# Patient Record
Sex: FEMALE | Race: WHITE | NOT HISPANIC OR LATINO | Employment: FULL TIME | URBAN - METROPOLITAN AREA
[De-identification: names, ages, dates, MRNs, and addresses within clinical notes are randomized per-mention and may not be internally consistent; named-entity substitution may affect disease eponyms.]

---

## 2017-03-30 RX ORDER — LISINOPRIL AND HYDROCHLOROTHIAZIDE 12.5; 1 MG/1; MG/1
TABLET ORAL
Qty: 30 TABLET | Refills: 5 | Status: SHIPPED | OUTPATIENT
Start: 2017-03-30 | End: 2017-10-01 | Stop reason: SDUPTHER

## 2017-06-04 RX ORDER — MESALAMINE 1000 MG/1
SUPPOSITORY RECTAL
Qty: 30 SUPPOSITORY | Refills: 12 | Status: SHIPPED | OUTPATIENT
Start: 2017-06-04 | End: 2018-07-05 | Stop reason: SDUPTHER

## 2017-06-16 ENCOUNTER — TELEPHONE (OUTPATIENT)
Dept: GASTROENTEROLOGY | Facility: CLINIC | Age: 62
End: 2017-06-16

## 2017-06-26 ENCOUNTER — TELEPHONE (OUTPATIENT)
Dept: GASTROENTEROLOGY | Facility: CLINIC | Age: 62
End: 2017-06-26

## 2017-06-26 NOTE — TELEPHONE ENCOUNTER
Returned patient's call. Went over prep information with patient. Ok to drink clear liquid with blue coloring). She also wants to know if its ok to have Colonoscopy with dx of Ulcerative proctocolitis. Transfer call to [facility].

## 2017-07-03 RX ORDER — MONTELUKAST SODIUM 10 MG/1
TABLET ORAL
Qty: 30 TABLET | Refills: 5 | Status: SHIPPED | OUTPATIENT
Start: 2017-07-03 | End: 2017-12-29 | Stop reason: SDUPTHER

## 2017-07-06 ENCOUNTER — OUTSIDE FACILITY SERVICE (OUTPATIENT)
Dept: GASTROENTEROLOGY | Facility: CLINIC | Age: 62
End: 2017-07-06

## 2017-07-06 PROCEDURE — G0121 COLON CA SCRN NOT HI RSK IND: HCPCS | Performed by: INTERNAL MEDICINE

## 2017-07-06 PROCEDURE — G0500 MOD SEDAT ENDO SERVICE >5YRS: HCPCS | Performed by: INTERNAL MEDICINE

## 2017-10-02 RX ORDER — LISINOPRIL AND HYDROCHLOROTHIAZIDE 12.5; 1 MG/1; MG/1
TABLET ORAL
Qty: 30 TABLET | Refills: 5 | Status: SHIPPED | OUTPATIENT
Start: 2017-10-02 | End: 2018-04-02 | Stop reason: SDUPTHER

## 2017-10-06 NOTE — TELEPHONE ENCOUNTER
10/10/2017      Rosie Gifford  [address]  [facility] [address]      Dear Rosie      This letter is to confirm your procedure with Dr Jordan on 11/27/17, Your arrival time is 10:30 a.m.. Please review the enclosed prep instructions for your procedure.    If you have any questions regarding these instructions or need to reschedule please call the office at  [phone].       Sincerely,      Jennifer/Constance      Your procedure will be done at the hospital below.     [x]  [facility], [address], [facility]  [address]       4th Floor GI Lab. Take elevator left of [employer] (south elevator).       Turn right off of elevator on 4th floor. Door in front of you        GI Lab.                 Went over bowel prep instructions with patient. Patient voiced understanding.

## 2017-11-15 ENCOUNTER — OFFICE VISIT (OUTPATIENT)
Dept: FAMILY MEDICINE CLINIC | Facility: CLINIC | Age: 62
End: 2017-11-15

## 2017-11-15 VITALS
HEART RATE: 90 BPM | DIASTOLIC BLOOD PRESSURE: 84 MMHG | WEIGHT: 230 LBS | TEMPERATURE: 97 F | HEIGHT: 69 IN | BODY MASS INDEX: 34.07 KG/M2 | OXYGEN SATURATION: 98 % | SYSTOLIC BLOOD PRESSURE: 124 MMHG

## 2017-11-15 DIAGNOSIS — J98.01 BRONCHOSPASM: ICD-10-CM

## 2017-11-15 DIAGNOSIS — R05.9 COUGH: ICD-10-CM

## 2017-11-15 DIAGNOSIS — J40 LTB (LARYNGOTRACHEOBRONCHITIS): Primary | ICD-10-CM

## 2017-11-15 PROCEDURE — 96372 THER/PROPH/DIAG INJ SC/IM: CPT | Performed by: FAMILY MEDICINE

## 2017-11-15 PROCEDURE — 99214 OFFICE O/P EST MOD 30 MIN: CPT | Performed by: FAMILY MEDICINE

## 2017-11-15 RX ORDER — METHYLPREDNISOLONE 4 MG/1
TABLET ORAL
Qty: 21 TABLET | Refills: 0 | Status: SHIPPED | OUTPATIENT
Start: 2017-11-15 | End: 2018-08-24

## 2017-11-15 RX ORDER — GUAIFENESIN AND CODEINE PHOSPHATE 100; 10 MG/5ML; MG/5ML
10 SOLUTION ORAL 4 TIMES DAILY PRN
Qty: 236 ML | Refills: 0 | Status: SHIPPED | OUTPATIENT
Start: 2017-11-15 | End: 2018-08-24

## 2017-11-15 RX ORDER — DOXYCYCLINE 100 MG/1
100 TABLET ORAL 2 TIMES DAILY
Qty: 20 TABLET | Refills: 0 | Status: SHIPPED | OUTPATIENT
Start: 2017-11-15 | End: 2017-11-25

## 2017-11-15 RX ORDER — VIT C/B6/B5/MAGNESIUM/HERB 173 50-5-6-5MG
500 CAPSULE ORAL DAILY
COMMUNITY
End: 2018-08-24

## 2017-11-15 RX ORDER — ALBUTEROL SULFATE 90 UG/1
2 AEROSOL, METERED RESPIRATORY (INHALATION) EVERY 4 HOURS PRN
Qty: 18 G | Refills: 1 | Status: SHIPPED | OUTPATIENT
Start: 2017-11-15 | End: 2019-08-28

## 2017-11-15 RX ORDER — FLUTICASONE PROPIONATE 50 MCG
2 SPRAY, SUSPENSION (ML) NASAL DAILY
COMMUNITY

## 2017-11-15 RX ORDER — METHYLPREDNISOLONE ACETATE 40 MG/ML
120 INJECTION, SUSPENSION INTRA-ARTICULAR; INTRALESIONAL; INTRAMUSCULAR; SOFT TISSUE ONCE
Status: COMPLETED | OUTPATIENT
Start: 2017-11-15 | End: 2017-11-15

## 2017-11-15 RX ORDER — PYRIDOXINE HCL (VITAMIN B6) 100 MG
500 TABLET ORAL DAILY
COMMUNITY
End: 2018-08-24

## 2017-11-15 RX ADMIN — METHYLPREDNISOLONE ACETATE 120 MG: 40 INJECTION, SUSPENSION INTRA-ARTICULAR; INTRALESIONAL; INTRAMUSCULAR; SOFT TISSUE at 11:16

## 2017-11-15 NOTE — PROGRESS NOTES
Subjective   Sylvia Childress is a 62 y.o. female.     Cough   This is a new problem. The current episode started in the past 7 days. The problem has been waxing and waning. The problem occurs every few hours. The cough is productive of sputum. Associated symptoms include ear congestion, headaches, nasal congestion, postnasal drip, rhinorrhea, a sore throat, shortness of breath and wheezing. Pertinent negatives include no chest pain, chills, ear pain, eye redness, fever, heartburn, hemoptysis, myalgias, rash, sweats or weight loss. Associated symptoms comments: Cough worsened with activity. Nothing aggravates the symptoms. Risk factors: nonsmoker, no 2nd hand smoke exposure. She has tried prescription cough suppressant for the symptoms. The treatment provided no relief. Her past medical history is significant for bronchitis and pneumonia. RADz     The following portions of the patient's history were reviewed and updated as appropriate: allergies, current medications, past family history, past medical history, past social history, past surgical history and problem list.    Review of Systems   Constitutional: Positive for fatigue. Negative for chills, fever and weight loss.   HENT: Positive for congestion, postnasal drip, rhinorrhea, sinus pressure and sore throat. Negative for ear pain, mouth sores, nosebleeds and trouble swallowing.    Eyes: Negative for pain, discharge and redness.   Respiratory: Positive for shortness of breath and wheezing. Negative for hemoptysis.    Cardiovascular: Negative for chest pain and palpitations.   Gastrointestinal: Negative for diarrhea, heartburn, nausea and vomiting.   Genitourinary: Negative for dysuria and hematuria.   Musculoskeletal: Negative for myalgias.   Skin: Negative for rash.   Neurological: Positive for weakness and headaches.   Hematological: Negative for adenopathy.   Psychiatric/Behavioral: Positive for sleep disturbance (due to cough).       Objective    Vitals:     11/15/17 1052   BP: 124/84   Pulse: 90   Temp: 97 °F (36.1 °C)   SpO2: 98%     Body mass index is 34.46 kg/(m^2).  Last 2 weights    11/15/17  1052   Weight: 230 lb (104 kg)       Physical Exam   Constitutional: She is oriented to person, place, and time. She appears well-developed and well-nourished. She is cooperative. She appears ill. No distress.   HENT:   Head: Normocephalic and atraumatic.   Right Ear: Tympanic membrane, external ear and ear canal normal.   Left Ear: Tympanic membrane, external ear and ear canal normal.   Nose: Mucosal edema and rhinorrhea (thick, mucoid) present.   Mouth/Throat: Mucous membranes are not dry. No oral lesions. Posterior oropharyngeal erythema present. No oropharyngeal exudate.   Eyes: Conjunctivae and lids are normal.   Neck: Neck supple.   Cardiovascular: Normal rate, regular rhythm, normal heart sounds and intact distal pulses.    Pulmonary/Chest: Effort normal. No respiratory distress (deep breathing triggers cough). She has decreased breath sounds. She has no wheezes. She has no rhonchi. She has no rales.   Lymphadenopathy:     She has no cervical adenopathy.        Right: No supraclavicular adenopathy present.        Left: No supraclavicular adenopathy present.   Neurological: She is alert and oriented to person, place, and time. Gait normal.   Skin: Skin is warm and dry. No rash noted.   Psychiatric: She has a normal mood and affect. Her behavior is normal.   Nursing note and vitals reviewed.      Assessment/Plan   Sylvia was seen today for cough and uri.    Diagnoses and all orders for this visit:    LTB (laryngotracheobronchitis)    Bronchospasm  -     methylPREDNISolone acetate (DEPO-medrol) injection 120 mg; Inject 3 mL into the shoulder, thigh, or buttocks 1 (One) Time.    Cough  -     methylPREDNISolone acetate (DEPO-medrol) injection 120 mg; Inject 3 mL into the shoulder, thigh, or buttocks 1 (One) Time.    Other orders  -     doxycycline (ADOXA) 100 MG tablet; Take  1 tablet by mouth 2 (Two) Times a Day for 10 days.  -     albuterol (PROVENTIL HFA;VENTOLIN HFA) 108 (90 Base) MCG/ACT inhaler; Inhale 2 puffs Every 4 (Four) Hours As Needed for Wheezing or Shortness of Air.  -     MethylPREDNISolone (MEDROL, JESSICA,) 4 MG tablet; Take as directed on package instructions.  -     guaifenesin-codeine (GUAIFENESIN AC) 100-10 MG/5ML liquid; Take 10 mL by mouth 4 (Four) Times a Day As Needed for Cough.    Tx as above due to hx of RADz, bronchitis resulting in PNE with prolonged recovery time.   Rest, fluids.  Patient was encouraged to keep me informed of any acute changes, lack of improvement, or any new concerning symptoms.  Pt is aware of reasons to seek emergent care.  Patient voiced understanding of all instructions and denied further questions.

## 2017-12-29 RX ORDER — MONTELUKAST SODIUM 10 MG/1
TABLET ORAL
Qty: 30 TABLET | Refills: 5 | Status: SHIPPED | OUTPATIENT
Start: 2017-12-29 | End: 2018-07-02 | Stop reason: SDUPTHER

## 2018-04-02 RX ORDER — LISINOPRIL AND HYDROCHLOROTHIAZIDE 12.5; 1 MG/1; MG/1
TABLET ORAL
Qty: 30 TABLET | Refills: 5 | Status: SHIPPED | OUTPATIENT
Start: 2018-04-02 | End: 2018-09-29 | Stop reason: SDUPTHER

## 2018-04-09 RX ORDER — AZELAIC ACID 0.15 G/G
GEL TOPICAL DAILY
Qty: 50 G | Refills: 5 | Status: SHIPPED | OUTPATIENT
Start: 2018-04-09 | End: 2020-06-08 | Stop reason: SDUPTHER

## 2018-04-09 RX ORDER — DESONIDE 0.5 MG/G
CREAM TOPICAL 2 TIMES DAILY
Qty: 15 G | Refills: 5 | Status: SHIPPED | OUTPATIENT
Start: 2018-04-09 | End: 2019-06-26 | Stop reason: SDUPTHER

## 2018-07-02 RX ORDER — MESALAMINE 1000 MG/1
SUPPOSITORY RECTAL
Qty: 30 SUPPOSITORY | Refills: 12 | OUTPATIENT
Start: 2018-07-02

## 2018-07-02 RX ORDER — MONTELUKAST SODIUM 10 MG/1
TABLET ORAL
Qty: 30 TABLET | Refills: 5 | Status: SHIPPED | OUTPATIENT
Start: 2018-07-02 | End: 2019-05-14

## 2018-07-03 NOTE — TELEPHONE ENCOUNTER
Please contact Sylvia and refill her prescription for 2-3 months.  Then please asked and were Nepali on the end of the day after this period of time.  Dr. Ordoñez

## 2018-07-08 RX ORDER — MESALAMINE 1000 MG/1
SUPPOSITORY RECTAL
Qty: 30 SUPPOSITORY | Refills: 12 | Status: SHIPPED | OUTPATIENT
Start: 2018-07-08 | End: 2019-07-23 | Stop reason: SDUPTHER

## 2018-08-24 ENCOUNTER — OFFICE VISIT (OUTPATIENT)
Dept: FAMILY MEDICINE CLINIC | Facility: CLINIC | Age: 63
End: 2018-08-24

## 2018-08-24 VITALS
SYSTOLIC BLOOD PRESSURE: 108 MMHG | BODY MASS INDEX: 36.88 KG/M2 | HEART RATE: 74 BPM | OXYGEN SATURATION: 97 % | DIASTOLIC BLOOD PRESSURE: 80 MMHG | HEIGHT: 67 IN | WEIGHT: 235 LBS

## 2018-08-24 DIAGNOSIS — Z13.820 ENCOUNTER FOR SCREENING FOR OSTEOPOROSIS: ICD-10-CM

## 2018-08-24 DIAGNOSIS — Z13.220 ENCOUNTER FOR LIPID SCREENING FOR CARDIOVASCULAR DISEASE: ICD-10-CM

## 2018-08-24 DIAGNOSIS — M85.89 OSTEOPENIA OF MULTIPLE SITES: ICD-10-CM

## 2018-08-24 DIAGNOSIS — Z13.6 ENCOUNTER FOR LIPID SCREENING FOR CARDIOVASCULAR DISEASE: ICD-10-CM

## 2018-08-24 DIAGNOSIS — R53.82 CHRONIC FATIGUE: ICD-10-CM

## 2018-08-24 DIAGNOSIS — I10 ESSENTIAL HYPERTENSION: ICD-10-CM

## 2018-08-24 DIAGNOSIS — Z23 NEED FOR DIPHTHERIA-TETANUS-PERTUSSIS (TDAP) VACCINE, ADULT/ADOLESCENT: ICD-10-CM

## 2018-08-24 DIAGNOSIS — Z23 NEED FOR SHINGLES VACCINE: ICD-10-CM

## 2018-08-24 DIAGNOSIS — Z12.31 ENCOUNTER FOR SCREENING MAMMOGRAM FOR BREAST CANCER: ICD-10-CM

## 2018-08-24 DIAGNOSIS — Z00.00 WELL WOMAN EXAM WITHOUT GYNECOLOGICAL EXAM: Primary | ICD-10-CM

## 2018-08-24 DIAGNOSIS — R63.5 ABNORMAL WEIGHT GAIN: ICD-10-CM

## 2018-08-24 DIAGNOSIS — Z13.1 SCREENING FOR DIABETES MELLITUS: ICD-10-CM

## 2018-08-24 LAB
ALBUMIN SERPL-MCNC: 4.7 G/DL (ref 3.5–5)
ALBUMIN/GLOB SERPL: 1.7 G/DL (ref 1–2)
ALP SERPL-CCNC: 89 U/L (ref 38–126)
ALT SERPL-CCNC: 39 U/L (ref 13–69)
AST SERPL-CCNC: 32 U/L (ref 15–46)
BILIRUB SERPL-MCNC: 0.5 MG/DL (ref 0.2–1.3)
BUN SERPL-MCNC: 19 MG/DL (ref 7–20)
BUN/CREAT SERPL: 27.1 (ref 7.1–23.5)
CALCIUM SERPL-MCNC: 10.1 MG/DL (ref 8.4–10.2)
CHLORIDE SERPL-SCNC: 99 MMOL/L (ref 98–107)
CHOLEST SERPL-MCNC: 192 MG/DL (ref 0–199)
CO2 SERPL-SCNC: 30 MMOL/L (ref 26–30)
CREAT SERPL-MCNC: 0.7 MG/DL (ref 0.6–1.3)
ERYTHROCYTE [DISTWIDTH] IN BLOOD BY AUTOMATED COUNT: 13.8 % (ref 11.5–14.5)
GLOBULIN SER CALC-MCNC: 2.8 GM/DL
GLUCOSE SERPL-MCNC: 91 MG/DL (ref 74–98)
HBA1C MFR BLD: 6.1 %
HCT VFR BLD AUTO: 41.3 % (ref 37–47)
HDLC SERPL-MCNC: 48 MG/DL (ref 40–60)
HGB BLD-MCNC: 13.5 G/DL (ref 12–16)
IRON SERPL-MCNC: 154 MCG/DL (ref 37–181)
LDLC SERPL CALC-MCNC: 109 MG/DL (ref 0–99)
MCH RBC QN AUTO: 31.7 PG (ref 27–31)
MCHC RBC AUTO-ENTMCNC: 32.7 G/DL (ref 30–37)
MCV RBC AUTO: 96.9 FL (ref 81–99)
PLATELET # BLD AUTO: 223 10*3/MM3 (ref 130–400)
POTASSIUM SERPL-SCNC: 4.1 MMOL/L (ref 3.5–5.1)
PROT SERPL-MCNC: 7.5 G/DL (ref 6.3–8.2)
RBC # BLD AUTO: 4.26 10*6/MM3 (ref 4.2–5.4)
SODIUM SERPL-SCNC: 139 MMOL/L (ref 137–145)
TRIGL SERPL-MCNC: 173 MG/DL
TSH SERPL DL<=0.005 MIU/L-ACNC: 1.09 MIU/ML (ref 0.47–4.68)
VLDLC SERPL CALC-MCNC: 34.6 MG/DL
WBC # BLD AUTO: 5.18 10*3/MM3 (ref 4.8–10.8)

## 2018-08-24 PROCEDURE — 90471 IMMUNIZATION ADMIN: CPT | Performed by: FAMILY MEDICINE

## 2018-08-24 PROCEDURE — 99396 PREV VISIT EST AGE 40-64: CPT | Performed by: FAMILY MEDICINE

## 2018-08-24 PROCEDURE — 90715 TDAP VACCINE 7 YRS/> IM: CPT | Performed by: FAMILY MEDICINE

## 2018-08-24 NOTE — PROGRESS NOTES
Subjective   Sylvia Childress is a 63 y.o. female.     History of Present Illness  Mrs. Childress presents today for routine f/u as wellness exam.     Reproductive History  A0  Pregnancy complications (HTN, DM): n/a  Sexual Activity: yes, monogamous male   Contraception: n/a, postmenopausal  Menses: dc'd  H/O abnormal pap: no  Cervical procedures: no     Social Hx  Household members: 2, pt and spouse  Marital status:   Tobacco use: never  EtOH use: occ, < 1 per day  Illicit drug use: never     Lifestyle  Diet: varied, excess calories  Exercise: intermittent walking  Guns in home: no  Using seatbelt: yes  Sunscreen: yes  Mental Health: stable, no concerns  Feels safe in home: yes     Screenings  Last pap:   Last breast exam: early   Last mammogram: 2015  Colonoscopy:   DEXA: 2015, osteopenia  FLP:   BG/A1c:   Vitamin D:   Last dental check up: < 6 months  Last vision exam: yearly  Immunizations UTD: no             Hypertension: Patient here for follow-up of elevated blood pressure. She is intermittently exercising and is fairly adherent to low salt diet.  Blood pressure is well controlled at home. Cardiac symptoms include fatigue, pedal edema. Patient denies chest pain, claudication, cough, exertional chest pressure/discomfort, irregular heart beat, near-syncope, orthopnea, palpitations, syncope and tachypnea.  Cardiovascular risk factors: hypertension, obesity (BMI >= 30 kg/m2) and sedentary lifestyle. Use of agents associated with hypertension: none. History of target organ damage: none. Taking medication as rx'd.  Denies side effects.      Sleep is improving. Rosacea well controlled on current meds. Seasonal allergies and RAD well controlled.  Not having to use rescue inhaler but rarely.      The following portions of the patient's history were reviewed and updated as appropriate: allergies, current medications, past family history, past medical history, past social history,  past surgical history and problem list.    Review of Systems   Constitutional: Positive for fatigue and unexpected weight change. Negative for activity change, appetite change, diaphoresis and fever.   HENT: Positive for postnasal drip. Negative for congestion, mouth sores, nosebleeds, rhinorrhea, sore throat and trouble swallowing.    Eyes: Negative for pain, redness, itching and visual disturbance.   Respiratory: Positive for cough (mild, dry, intermittent ). Negative for chest tightness, shortness of breath and wheezing.    Cardiovascular: Positive for leg swelling.   Gastrointestinal: Negative for abdominal pain, diarrhea, nausea and vomiting.   Endocrine: Positive for heat intolerance. Negative for polydipsia and polyuria.   Genitourinary: Negative for dyspareunia, dysuria, frequency, genital sores, hematuria, pelvic pain, urgency, vaginal bleeding and vaginal discharge.   Musculoskeletal: Positive for arthralgias and joint swelling. Negative for back pain, gait problem, myalgias, neck pain and neck stiffness.   Skin: Negative for rash and wound.   Neurological: Negative for dizziness, syncope, weakness, numbness and headaches.   Hematological: Negative for adenopathy. Does not bruise/bleed easily.   Psychiatric/Behavioral: Negative for confusion, dysphoric mood and suicidal ideas. The patient is not nervous/anxious.        Objective    Vitals:    08/24/18 1110   BP: 108/80   Pulse: 74   SpO2: 97%     Body mass index is 37.08 kg/m².  1    08/24/18  1110   Weight: 107 kg (235 lb)       Physical Exam   Constitutional: She is oriented to person, place, and time. She appears well-developed and well-nourished. She is cooperative. She does not appear ill. No distress.   obese   HENT:   Head: Normocephalic and atraumatic.   Right Ear: Tympanic membrane, external ear and ear canal normal.   Left Ear: Tympanic membrane, external ear and ear canal normal.   Nose: Nose normal. No mucosal edema or rhinorrhea.    Mouth/Throat: Oropharynx is clear and moist and mucous membranes are normal. No oral lesions. No posterior oropharyngeal erythema.   Eyes: Conjunctivae, EOM and lids are normal.   Neck: Phonation normal. Neck supple. Normal carotid pulses present. No thyroid mass and no thyromegaly present.   Cardiovascular: Normal rate, regular rhythm, S1 normal, S2 normal and intact distal pulses.    Pulmonary/Chest: Effort normal and breath sounds normal.   Abdominal: Soft. Bowel sounds are normal. She exhibits no distension and no mass (Exam limited by body habitus). There is no hepatosplenomegaly (exam limited by body habitus). There is no tenderness.   Genitourinary: Rectum normal, vagina normal and uterus normal. Rectal exam shows no mass, anal tone normal and guaiac negative stool. Pelvic exam was performed with patient supine. There is no rash or lesion on the right labia. There is no rash or lesion on the left labia. Uterus is not enlarged and not tender. Cervix exhibits no motion tenderness, no discharge and no friability. Right adnexum displays no mass, no tenderness and no fullness. Left adnexum displays no mass, no tenderness and no fullness. No erythema or bleeding in the vagina. No vaginal discharge found.   Musculoskeletal: She exhibits no edema, tenderness or deformity.     Vascular Status -  Her right foot exhibits abnormal foot edema (mild). Her left foot exhibits abnormal foot edema (mild).  Lymphadenopathy:     She has no cervical adenopathy.   Neurological: She is alert and oriented to person, place, and time. She has normal strength and normal reflexes. She displays no tremor. Gait normal.   Skin: Skin is warm and dry. No ecchymosis and no rash noted. There is pallor.   Psychiatric: She has a normal mood and affect. Her behavior is normal. Cognition and memory are normal.   Good eye contact. Answers questions appropriately. Good personal hygiene and grooming.   Nursing note and vitals  reviewed.      Assessment/Plan   Sylvia was seen today for annual exam.    Diagnoses and all orders for this visit:    Well woman exam without gynecological exam  -     CBC (No Diff)  -     Comprehensive Metabolic Panel  -     Lipid Panel  -     Iron  -     Hemoglobin A1c  -     TSH Rfx On Abnormal To Free T4  -     Tdap Vaccine Greater Than or Equal To 8yo IM  -     Mammo Screening Digital Tomosynthesis Bilateral With CAD; Future  -     DEXA Bone Density Axial; Future    Screening for diabetes mellitus  -     Hemoglobin A1c    Encounter for lipid screening for cardiovascular disease  -     Lipid Panel    Need for diphtheria-tetanus-pertussis (Tdap) vaccine, adult/adolescent  -     Tdap Vaccine Greater Than or Equal To 8yo IM    Need for shingles vaccine  -     Zoster Vac Recomb Adjuvanted 50 MCG reconstituted suspension; Inject 50 mcg into the appropriate muscle as directed by prescriber Take As Directed. Repeat 6 months.    Abnormal weight gain  -     Comprehensive Metabolic Panel  -     TSH Rfx On Abnormal To Free T4    Essential hypertension  -     CBC (No Diff)  -     Comprehensive Metabolic Panel  -     TSH Rfx On Abnormal To Free T4    Osteopenia of multiple sites  -     DEXA Bone Density Axial; Future    Chronic fatigue  -     CBC (No Diff)  -     Comprehensive Metabolic Panel  -     Iron  -     TSH Rfx On Abnormal To Free T4    Encounter for screening mammogram for breast cancer  -     Mammo Screening Digital Tomosynthesis Bilateral With CAD; Future    Encounter for screening for osteoporosis  -     DEXA Bone Density Axial; Future    Age appropriate preventive care reviewed including cancer screenings, safety measures, mental health concerns, supplements, prevention of CV disease and DM, etc. Handout provided.    I will contact patient regarding test results and provide instructions regarding any necessary changes in plan of care.    HTN controlled.      Obesity with excess caloric intake and generally  sedentary lifestyle. Nutrition and activity goals reviewed including: mainly water to drink, limit white flour/processed sugar, high protein, high fiber carbs, good breakfast, working toward 150 mins cardio per week, resistance training 2x/week. Pt advised to eat a heart healthy diet.    Routine yearly follow-up, sooner as needed/instructed.  Patient was encouraged to keep me informed of any acute changes or any new concerning symptoms.  Pt is aware of reasons to seek emergent care.  Patient voiced understanding of all instructions and denied further questions.        Please note that portions of this note may have been completed with a voice recognition program. Efforts were made to edit the dictations, but occasionally words are mistranscribed.

## 2018-08-27 DIAGNOSIS — R73.03 PREDIABETES: Primary | ICD-10-CM

## 2018-10-01 RX ORDER — LISINOPRIL AND HYDROCHLOROTHIAZIDE 12.5; 1 MG/1; MG/1
TABLET ORAL
Qty: 30 TABLET | Refills: 0 | Status: SHIPPED | OUTPATIENT
Start: 2018-10-01 | End: 2018-10-28 | Stop reason: SDUPTHER

## 2018-10-29 RX ORDER — LISINOPRIL AND HYDROCHLOROTHIAZIDE 12.5; 1 MG/1; MG/1
TABLET ORAL
Qty: 30 TABLET | Refills: 5 | Status: SHIPPED | OUTPATIENT
Start: 2018-10-29 | End: 2019-05-01 | Stop reason: SDUPTHER

## 2018-11-23 ENCOUNTER — RESULTS ENCOUNTER (OUTPATIENT)
Dept: FAMILY MEDICINE CLINIC | Facility: CLINIC | Age: 63
End: 2018-11-23

## 2018-11-23 DIAGNOSIS — R73.03 PREDIABETES: ICD-10-CM

## 2019-05-02 RX ORDER — LISINOPRIL AND HYDROCHLOROTHIAZIDE 12.5; 1 MG/1; MG/1
TABLET ORAL
Qty: 30 TABLET | Refills: 2 | Status: SHIPPED | OUTPATIENT
Start: 2019-05-02 | End: 2019-07-23 | Stop reason: SDUPTHER

## 2019-05-14 ENCOUNTER — OFFICE VISIT (OUTPATIENT)
Dept: FAMILY MEDICINE CLINIC | Facility: CLINIC | Age: 64
End: 2019-05-14

## 2019-05-14 VITALS
HEART RATE: 97 BPM | BODY MASS INDEX: 35.81 KG/M2 | WEIGHT: 228.13 LBS | TEMPERATURE: 97.6 F | HEIGHT: 67 IN | SYSTOLIC BLOOD PRESSURE: 115 MMHG | OXYGEN SATURATION: 94 % | DIASTOLIC BLOOD PRESSURE: 85 MMHG

## 2019-05-14 DIAGNOSIS — R10.30 LOWER ABDOMINAL PAIN: Primary | ICD-10-CM

## 2019-05-14 DIAGNOSIS — R31.29 MICROSCOPIC HEMATURIA: ICD-10-CM

## 2019-05-14 DIAGNOSIS — R53.81 MALAISE: ICD-10-CM

## 2019-05-14 PROBLEM — J30.9 ALLERGIC RHINITIS: Status: ACTIVE | Noted: 2017-03-02

## 2019-05-14 LAB
ALBUMIN SERPL-MCNC: 4.1 G/DL (ref 3.5–5)
ALBUMIN/GLOB SERPL: 1.4 G/DL (ref 1–2)
ALP SERPL-CCNC: 90 U/L (ref 38–126)
ALT SERPL-CCNC: 40 U/L (ref 13–69)
AMYLASE SERPL-CCNC: 48 U/L (ref 30–110)
AST SERPL-CCNC: 25 U/L (ref 15–46)
BACTERIA #/AREA URNS HPF: ABNORMAL /HPF
BASOPHILS # BLD AUTO: 0.03 10*3/MM3 (ref 0–0.2)
BASOPHILS NFR BLD AUTO: 0.3 % (ref 0–1.5)
BILIRUB BLD-MCNC: ABNORMAL MG/DL
BILIRUB SERPL-MCNC: 0.5 MG/DL (ref 0.2–1.3)
BUN SERPL-MCNC: 12 MG/DL (ref 7–20)
BUN/CREAT SERPL: 17.1 (ref 7.1–23.5)
CALCIUM SERPL-MCNC: 9.6 MG/DL (ref 8.4–10.2)
CASTS URNS MICRO: ABNORMAL
CHLORIDE SERPL-SCNC: 97 MMOL/L (ref 98–107)
CLARITY, POC: CLEAR
CO2 SERPL-SCNC: 32 MMOL/L (ref 26–30)
COLOR UR: YELLOW
CREAT SERPL-MCNC: 0.7 MG/DL (ref 0.6–1.3)
CRP SERPL-MCNC: 13.9 MG/DL (ref 0–1)
CRYSTALS URNS MICRO: ABNORMAL
EOSINOPHIL # BLD AUTO: 0.05 10*3/MM3 (ref 0–0.4)
EOSINOPHIL NFR BLD AUTO: 0.4 % (ref 0.3–6.2)
EPI CELLS #/AREA URNS HPF: ABNORMAL /HPF
ERYTHROCYTE [DISTWIDTH] IN BLOOD BY AUTOMATED COUNT: 14.2 % (ref 12.3–15.4)
GLOBULIN SER CALC-MCNC: 2.9 GM/DL
GLUCOSE SERPL-MCNC: 103 MG/DL (ref 74–98)
GLUCOSE UR STRIP-MCNC: NEGATIVE MG/DL
HCT VFR BLD AUTO: 40.7 % (ref 34–46.6)
HGB BLD-MCNC: 13.4 G/DL (ref 12–15.9)
IMM GRANULOCYTES # BLD AUTO: 0.04 10*3/MM3 (ref 0–0.05)
IMM GRANULOCYTES NFR BLD AUTO: 0.4 % (ref 0–0.5)
KETONES UR QL: NEGATIVE
LEUKOCYTE EST, POC: NEGATIVE
LIPASE SERPL-CCNC: 68 U/L (ref 23–300)
LYMPHOCYTES # BLD AUTO: 1.27 10*3/MM3 (ref 0.7–3.1)
LYMPHOCYTES NFR BLD AUTO: 11.2 % (ref 19.6–45.3)
MCH RBC QN AUTO: 32.3 PG (ref 26.6–33)
MCHC RBC AUTO-ENTMCNC: 32.9 G/DL (ref 31.5–35.7)
MCV RBC AUTO: 98.1 FL (ref 79–97)
MONOCYTES # BLD AUTO: 0.94 10*3/MM3 (ref 0.1–0.9)
MONOCYTES NFR BLD AUTO: 8.3 % (ref 5–12)
MUCOUS THREADS URNS QL MICRO: ABNORMAL /HPF
NEUTROPHILS # BLD AUTO: 9.05 10*3/MM3 (ref 1.7–7)
NEUTROPHILS NFR BLD AUTO: 79.4 % (ref 42.7–76)
NITRITE UR-MCNC: NEGATIVE MG/ML
NRBC BLD AUTO-RTO: 0 /100 WBC (ref 0–0.2)
PH UR: 7 [PH] (ref 5–8)
PLATELET # BLD AUTO: 204 10*3/MM3 (ref 140–450)
POTASSIUM SERPL-SCNC: 3.9 MMOL/L (ref 3.5–5.1)
PROT SERPL-MCNC: 7 G/DL (ref 6.3–8.2)
PROT UR STRIP-MCNC: NEGATIVE MG/DL
RBC # BLD AUTO: 4.15 10*6/MM3 (ref 3.77–5.28)
RBC # UR STRIP: ABNORMAL /UL
RBC #/AREA URNS HPF: ABNORMAL /HPF
SODIUM SERPL-SCNC: 139 MMOL/L (ref 137–145)
SP GR UR: 1.01 (ref 1–1.03)
UROBILINOGEN UR QL: NORMAL
WBC # BLD AUTO: 11.38 10*3/MM3 (ref 3.4–10.8)
WBC #/AREA URNS HPF: ABNORMAL /HPF

## 2019-05-14 PROCEDURE — 99214 OFFICE O/P EST MOD 30 MIN: CPT | Performed by: FAMILY MEDICINE

## 2019-05-14 PROCEDURE — 81003 URINALYSIS AUTO W/O SCOPE: CPT | Performed by: FAMILY MEDICINE

## 2019-05-14 RX ORDER — METRONIDAZOLE 500 MG/1
500 TABLET ORAL 2 TIMES DAILY
Qty: 14 TABLET | Refills: 0 | Status: SHIPPED | OUTPATIENT
Start: 2019-05-14 | End: 2019-07-18 | Stop reason: SDUPTHER

## 2019-05-14 RX ORDER — CIPROFLOXACIN 500 MG/1
500 TABLET, FILM COATED ORAL 2 TIMES DAILY
Qty: 14 TABLET | Refills: 0 | Status: SHIPPED | OUTPATIENT
Start: 2019-05-14 | End: 2019-07-18 | Stop reason: SDUPTHER

## 2019-05-14 NOTE — PROGRESS NOTES
Subjective   Sylvia Childress is a 63 y.o. female.     She c/o abd pain      Abdominal Pain   This is a new problem. The current episode started in the past 7 days (began with mild symptoms 3 nights ago). The onset quality is gradual. The problem occurs constantly. The problem has been waxing and waning. The pain is located in the suprapubic region and periumbilical region. The pain is moderate. The quality of the pain is aching and cramping. The abdominal pain does not radiate. Associated symptoms include anorexia and a fever (possible subjective). Pertinent negatives include no constipation, diarrhea, dysuria, flatus, frequency, headaches, hematochezia, hematuria, melena, nausea, vomiting or weight loss. Associated symptoms comments: Bloating, malaise. The pain is aggravated by movement. Relieved by: analgesic. She has tried acetaminophen (Excedrin) for the symptoms. The treatment provided mild relief. Her past medical history is significant for ulcerative colitis.     Has upcoming apt at [facility] for annual TVUS (part of research program).    The following portions of the patient's history were reviewed and updated as appropriate: allergies, current medications, past family history, past medical history, past social history, past surgical history and problem list.    Review of Systems   Constitutional: Positive for appetite change, chills, fatigue and fever (possible subjective). Negative for weight loss.   HENT: Negative for congestion, sore throat and trouble swallowing.    Eyes: Negative for visual disturbance.   Respiratory: Negative for cough and shortness of breath.    Cardiovascular: Positive for leg swelling (mild, stable). Negative for chest pain and palpitations.   Gastrointestinal: Positive for abdominal distention, abdominal pain and anorexia. Negative for blood in stool, constipation, diarrhea, flatus, hematochezia, melena, nausea and vomiting.   Genitourinary: Negative for dysuria,  frequency, hematuria, vaginal bleeding and vaginal discharge.   Skin: Negative for rash.   Neurological: Positive for weakness. Negative for headaches.   Hematological: Negative for adenopathy.   Psychiatric/Behavioral: Positive for sleep disturbance. Negative for confusion. The patient is nervous/anxious.      Objective    Vitals:    05/14/19 1019   BP: 115/85   Pulse: 97   Temp: 97.6 °F (36.4 °C)   SpO2: 94%     Body mass index is 36.02 kg/m².      05/14/19  1019   Weight: 103 kg (228 lb 2 oz)         Physical Exam   Constitutional: She is oriented to person, place, and time. She appears well-developed and well-nourished. She is cooperative. She appears ill. No distress.   obese   HENT:   Head: Normocephalic and atraumatic.   Right Ear: Tympanic membrane, external ear and ear canal normal.   Left Ear: Tympanic membrane, external ear and ear canal normal.   Nose: Nose normal.   Mouth/Throat: Mucous membranes are normal. Mucous membranes are not dry. No oral lesions. Posterior oropharyngeal erythema present. No oropharyngeal exudate.   Eyes: Conjunctivae and lids are normal. No scleral icterus. Right eye exhibits no nystagmus. Left eye exhibits no nystagmus.   Neck: Neck supple.   Cardiovascular: Normal rate, regular rhythm, normal heart sounds and intact distal pulses.   Pulmonary/Chest: Effort normal and breath sounds normal.   Abdominal: Soft. She exhibits no distension and no mass (exam limited by body habitus). Bowel sounds are decreased. There is no hepatosplenomegaly (exam limited by body habitus). There is tenderness (mild) in the suprapubic area and left lower quadrant. There is no rigidity, no rebound, no guarding and no CVA tenderness.     Vascular Status -  Her right foot exhibits abnormal foot edema (mild). Her left foot exhibits abnormal foot edema (mild).  Lymphadenopathy:     She has no cervical adenopathy.   Neurological: She is alert and oriented to person, place, and time.   Skin: Skin is warm and  dry. No rash noted.   Nursing note and vitals reviewed.    Recent Results (from the past 24 hour(s))   POC Urinalysis Dipstick, Automated    Collection Time: 05/14/19  1:07 PM   Result Value Ref Range    Color Yellow Yellow, Straw, Dark Yellow, Betsy    Clarity, UA Clear Clear    Specific Gravity  1.015 1.005 - 1.030    pH, Urine 7.0 5.0 - 8.0    Leukocytes Negative Negative    Nitrite, UA Negative Negative    Protein, POC Negative Negative mg/dL    Glucose, UA Negative Negative, 1000 mg/dL (3+) mg/dL    Ketones, UA Negative Negative    Urobilinogen, UA Normal Normal    Bilirubin 1 mg/dL (A) Negative    Blood, UA 50 Sang/ul (A) Negative     Assessment/Plan   Sylvia was seen today for abdominal pain and diverticulitis.    Diagnoses and all orders for this visit:    Lower abdominal pain  -     CBC & Differential  -     Comprehensive Metabolic Panel  -     Amylase  -     Lipase  -     C-reactive Protein  -     Urinalysis, Microscopic Only - Urine, Clean Catch  -     POC Urinalysis Dipstick, Automated    Microscopic hematuria  -     CBC & Differential  -     Comprehensive Metabolic Panel  -     C-reactive Protein  -     Urinalysis, Microscopic Only - Urine, Clean Catch    Malaise  -     CBC & Differential  -     Comprehensive Metabolic Panel  -     C-reactive Protein  -     POC Urinalysis Dipstick, Automated    Other orders  -     ciprofloxacin (CIPRO) 500 MG tablet; Take 1 tablet by mouth 2 (Two) Times a Day for 7 days.  -     metroNIDAZOLE (FLAGYL) 500 MG tablet; Take 1 tablet by mouth 2 (Two) Times a Day for 7 days.       Suprapubic and LLQ pain of multiple possible etiologies. She has non-acute abd exam today. Clinically stable. I have reviewed risks/benefits and potential side effects of various diagnostic/treatment options. Pt and  voice understanding and wish to proceed with empiric coverage for UTI, diverticulitis as well as lab eval as above. Pending response to tx, lab results and clinical course,  consider CT abd/pelvis. She will keep apt as scheduled for TVUS at  Monday next week. Encouraged adequate fluid, bland diet.    I will contact patient regarding test results and provide instructions regarding any necessary changes in plan of care.  Patient was encouraged to keep me informed of any acute changes, lack of improvement, or any new concerning symptoms.  Pt is aware of reasons to seek emergent care.  Patient voiced understanding of all instructions and denied further questions.

## 2019-05-15 DIAGNOSIS — R79.82 ELEVATED C-REACTIVE PROTEIN (CRP): ICD-10-CM

## 2019-05-15 DIAGNOSIS — R10.2 SUPRAPUBIC PAIN: ICD-10-CM

## 2019-05-15 DIAGNOSIS — R10.32 LLQ PAIN: Primary | ICD-10-CM

## 2019-05-15 DIAGNOSIS — D72.9 NEUTROPHILIC LEUKOCYTOSIS: ICD-10-CM

## 2019-05-20 ENCOUNTER — TELEPHONE (OUTPATIENT)
Dept: INTERNAL MEDICINE | Facility: CLINIC | Age: 64
End: 2019-05-20

## 2019-05-20 NOTE — TELEPHONE ENCOUNTER
Patient paged on call provider.  Patient reports she is on antibiotics for diverticulitis.  She was bit by a dog 2 days ago. The dog was vaccinated, but she is noticing some redness around the bite marks.  She was concerned she may need a tetanus vaccine or even a longer course of antibiotics.  She wanted to know if she should go to the ER or the urgent care.  I advised her to go to the .

## 2019-05-29 ENCOUNTER — OFFICE VISIT (OUTPATIENT)
Dept: FAMILY MEDICINE CLINIC | Facility: CLINIC | Age: 64
End: 2019-05-29

## 2019-05-29 VITALS
SYSTOLIC BLOOD PRESSURE: 110 MMHG | HEART RATE: 78 BPM | BODY MASS INDEX: 35.79 KG/M2 | DIASTOLIC BLOOD PRESSURE: 80 MMHG | OXYGEN SATURATION: 98 % | HEIGHT: 67 IN | WEIGHT: 228 LBS
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
## 2019-05-29 DIAGNOSIS — S81.851D DOG BITE OF RIGHT LOWER LEG, SUBSEQUENT ENCOUNTER: Primary | ICD-10-CM

## 2019-05-29 DIAGNOSIS — T14.8XXA ANIMAL BITE: ICD-10-CM

## 2019-05-29 PROCEDURE — 99213 OFFICE O/P EST LOW 20 MIN: CPT | Performed by: FAMILY MEDICINE

## 2019-05-29 RX ORDER — AMOXICILLIN AND CLAVULANATE POTASSIUM 875; 125 MG/1; MG/1
1 TABLET, FILM COATED ORAL EVERY 12 HOURS
Qty: 14 TABLET | Refills: 0 | Status: SHIPPED | OUTPATIENT
Start: 2019-05-29 | End: 2019-06-05

## 2019-05-29 NOTE — PROGRESS NOTES
"Subjective   Sylvia Childress is a 63 y.o. female.     History of Present Illness  Mrs. Childress presents here with her  for recheck of dog bite.  She was seen at urgent care in [place] approximately 2 weeks ago after suffering a dog bite to the right lower extremity at a friend's house.  Dog was a domesticated pet, fully vaccinated.  During urgent care visit she had tetanus updated, was placed on a short course of Augmentin (had already been treated with Flagyl and Cipro for diverticulitis in parentheses.  She has had improvement in pain, swelling but is concerned about persistent bruising, moderate aching/burning tenderness at the site. Bruising has \"spread down leg\". Denies weakness or numbness in lower extremity.  No fevers or night sweats.  No malaise.    The following portions of the patient's history were reviewed and updated as appropriate: allergies, current medications, past family history, past medical history, past social history, past surgical history and problem list.    Review of Systems   Constitutional: Negative for chills, fever and unexpected weight change.   HENT: Negative for sore throat and trouble swallowing.    Respiratory: Negative for cough and shortness of breath.    Cardiovascular: Positive for leg swelling. Negative for chest pain.   Gastrointestinal: Negative for diarrhea, nausea and vomiting.   Musculoskeletal: Positive for arthralgias and gait problem (mild).   Skin: Positive for wound.   Neurological: Negative for dizziness, weakness, numbness and headaches.   Hematological: Negative for adenopathy.   Psychiatric/Behavioral: Negative for confusion.       Objective    Vitals:    05/29/19 1006   BP: 110/80   Pulse: 78   SpO2: 98%     Body mass index is 35.71 kg/m².      05/29/19  1006   Weight: 103 kg (228 lb)       Physical Exam   Constitutional: She is oriented to person, place, and time. She appears well-developed and well-nourished. She is cooperative. She does not appear " ill. No distress.   obese   HENT:   Head: Normocephalic and atraumatic.   Mouth/Throat: Mucous membranes are normal. Mucous membranes are not dry.   Eyes: Conjunctivae and lids are normal.   Musculoskeletal:        Right knee: She exhibits normal range of motion and no swelling.     Vascular Status -  Her right foot exhibits no edema. Her left foot exhibits no edema.  Neurological: She is alert and oriented to person, place, and time. She has normal strength. No sensory deficit. Gait normal.   Skin: Skin is warm and dry. Lesion noted.        Psychiatric: She has a normal mood and affect. Her behavior is normal. Cognition and memory are normal.   Nursing note and vitals reviewed.      Assessment/Plan   Sylvia was seen today for animal bite.    Diagnoses and all orders for this visit:    Dog bite of right lower leg, subsequent encounter    Animal bite  -     amoxicillin-clavulanate (AUGMENTIN) 875-125 MG per tablet; Take 1 tablet by mouth Every 12 (Twelve) Hours for 7 days.       Due to persistent swelling, pain tx with additional 1 week of abx. Local wound care reviewed. Tetanus UTD.  Patient was encouraged to keep me informed of any acute changes, lack of improvement, or any new concerning symptoms.  Pt is aware of reasons to seek emergent care.  Patient voiced understanding of all instructions and denied further questions.

## 2019-06-27 RX ORDER — DESONIDE 0.5 MG/G
CREAM TOPICAL
Qty: 15 G | Refills: 0 | Status: SHIPPED | OUTPATIENT
Start: 2019-06-27 | End: 2020-02-26

## 2019-07-18 ENCOUNTER — TELEPHONE (OUTPATIENT)
Dept: FAMILY MEDICINE CLINIC | Facility: CLINIC | Age: 64
End: 2019-07-18

## 2019-07-18 RX ORDER — CIPROFLOXACIN 500 MG/1
500 TABLET, FILM COATED ORAL 2 TIMES DAILY
Qty: 14 TABLET | Refills: 0 | Status: SHIPPED | OUTPATIENT
Start: 2019-07-18 | End: 2019-07-25

## 2019-07-18 RX ORDER — METRONIDAZOLE 500 MG/1
500 TABLET ORAL 2 TIMES DAILY
Qty: 14 TABLET | Refills: 0 | Status: SHIPPED | OUTPATIENT
Start: 2019-07-18 | End: 2019-07-25

## 2019-07-18 NOTE — TELEPHONE ENCOUNTER
meds sent into pharmacy. She should let us know if she does not have significant improvement. Seek emergent care if needed over weekend.

## 2019-07-23 RX ORDER — LISINOPRIL AND HYDROCHLOROTHIAZIDE 12.5; 1 MG/1; MG/1
TABLET ORAL
Qty: 30 TABLET | Refills: 5 | Status: SHIPPED | OUTPATIENT
Start: 2019-07-23 | End: 2020-01-27

## 2019-07-23 RX ORDER — MESALAMINE 1000 MG/1
SUPPOSITORY RECTAL
Qty: 30 SUPPOSITORY | Refills: 0 | Status: SHIPPED | OUTPATIENT
Start: 2019-07-23 | End: 2019-12-05 | Stop reason: SDUPTHER

## 2019-08-28 ENCOUNTER — OFFICE VISIT (OUTPATIENT)
Dept: FAMILY MEDICINE CLINIC | Facility: CLINIC | Age: 64
End: 2019-08-28

## 2019-08-28 VITALS
SYSTOLIC BLOOD PRESSURE: 112 MMHG | DIASTOLIC BLOOD PRESSURE: 76 MMHG | HEART RATE: 84 BPM | BODY MASS INDEX: 35.4 KG/M2 | OXYGEN SATURATION: 98 % | WEIGHT: 226 LBS

## 2019-08-28 DIAGNOSIS — M79.674 TOE PAIN, RIGHT: Primary | ICD-10-CM

## 2019-08-28 DIAGNOSIS — M67.479 MUCOUS CYST OF TOE: ICD-10-CM

## 2019-08-28 PROCEDURE — 99213 OFFICE O/P EST LOW 20 MIN: CPT | Performed by: FAMILY MEDICINE

## 2019-08-28 NOTE — PROGRESS NOTES
Subjective   Sylvia Childress is a 64 y.o. female.     History of Present Illness     Mrs. Childress presents today with complaint of blister on top of right middle toe noted 2 months ago.  Not particularly painful.  Becomes more of an issue when she wears a closed toe shoe.  No trauma to the area, no change in physical activity.  No particular treatments applied.     The following portions of the patient's history were reviewed and updated as appropriate: allergies, current medications, past family history, past medical history, past social history, past surgical history and problem list.    Review of Systems   Constitutional: Positive for fatigue. Negative for diaphoresis, fever and unexpected weight change.   HENT: Positive for postnasal drip. Negative for congestion, mouth sores, nosebleeds, rhinorrhea, sore throat and trouble swallowing.    Eyes: Negative for visual disturbance.   Respiratory: Negative for cough, chest tightness, shortness of breath and wheezing.    Cardiovascular: Positive for leg swelling. Negative for chest pain and palpitations.   Gastrointestinal: Negative for abdominal pain, blood in stool, diarrhea, nausea and vomiting.   Endocrine: Positive for heat intolerance. Negative for polydipsia and polyuria.   Genitourinary: Negative for dysuria, frequency, hematuria, pelvic pain, urgency and vaginal bleeding.   Musculoskeletal: Positive for arthralgias. Negative for back pain, myalgias and neck pain.   Skin: Negative for rash and wound.        As per HPI   Neurological: Negative for dizziness, syncope, weakness, numbness and headaches.   Hematological: Negative for adenopathy. Does not bruise/bleed easily.   Psychiatric/Behavioral: Negative for confusion and dysphoric mood. The patient is not nervous/anxious.    Pt's previous ROS reviewed and updated as indicated.       Objective    Vitals:    08/28/19 1109   BP: 112/76   Pulse: 84   SpO2: 98%     Body mass index is 35.4 kg/m².       08/28/19  1109   Weight: 103 kg (226 lb)       Physical Exam   Constitutional: She appears well-developed and well-nourished. She is cooperative. She does not appear ill. No distress.   HENT:   Head: Normocephalic and atraumatic.     Vascular Status -  Her right foot exhibits no edema. Her left foot exhibits no edema.  Neurological: She is alert. No sensory deficit.   Skin: Skin is warm and dry. Lesion noted. No bruising and no rash noted.   Mucous cyst dorsal 3rd right toe b/n DIP joint and nailbed. Mildly TTP. Minor trauma to lesion. No surrounding cellulitis.   Psychiatric: She has a normal mood and affect. Her behavior is normal. Cognition and memory are normal.   Nursing note and vitals reviewed.      Assessment/Plan   Sylvia was seen today for blister.    Diagnoses and all orders for this visit:    Toe pain, right  -     Ambulatory Referral to Podiatry    Mucous cyst of toe  -     Ambulatory Referral to Podiatry       Mucous cyst of right third toe with desire for evaluation by podiatry.  Referral as above.    She will keep her routine follow-up, otherwise follow-up as needed.    Patient was encouraged to keep me informed of any acute changes, lack of improvement, or any new concerning symptoms.  Pt is aware of reasons to seek emergent care.  Patient voiced understanding of all instructions and denied further questions.              Please note that portions of this note may have been completed with a voice recognition program. Efforts were made to edit the dictations, but occasionally words are mistranscribed.            Detail Level: Zone Note Text (......Xxx Chief Complaint.): This diagnosis correlates with the Other (Free Text): Lab form given \\n\\nStelara information given to pt and mom

## 2019-10-25 ENCOUNTER — OFFICE VISIT (OUTPATIENT)
Dept: FAMILY MEDICINE CLINIC | Facility: CLINIC | Age: 64
End: 2019-10-25

## 2019-10-25 VITALS
SYSTOLIC BLOOD PRESSURE: 120 MMHG | WEIGHT: 221 LBS | OXYGEN SATURATION: 98 % | HEART RATE: 91 BPM | DIASTOLIC BLOOD PRESSURE: 82 MMHG | TEMPERATURE: 97.7 F | BODY MASS INDEX: 34.69 KG/M2 | HEIGHT: 67 IN

## 2019-10-25 DIAGNOSIS — B34.9 VIRAL ILLNESS: Primary | ICD-10-CM

## 2019-10-25 DIAGNOSIS — R51.9 ACUTE NONINTRACTABLE HEADACHE, UNSPECIFIED HEADACHE TYPE: ICD-10-CM

## 2019-10-25 PROCEDURE — 96372 THER/PROPH/DIAG INJ SC/IM: CPT | Performed by: NURSE PRACTITIONER

## 2019-10-25 PROCEDURE — 99213 OFFICE O/P EST LOW 20 MIN: CPT | Performed by: NURSE PRACTITIONER

## 2019-10-25 RX ORDER — KETOROLAC TROMETHAMINE 30 MG/ML
30 INJECTION, SOLUTION INTRAMUSCULAR; INTRAVENOUS ONCE
Status: COMPLETED | OUTPATIENT
Start: 2019-10-25 | End: 2019-10-25

## 2019-10-25 RX ORDER — KETOROLAC TROMETHAMINE 30 MG/ML
30 INJECTION, SOLUTION INTRAMUSCULAR; INTRAVENOUS ONCE
Status: DISCONTINUED | OUTPATIENT
Start: 2019-10-25 | End: 2019-10-25

## 2019-10-25 RX ADMIN — KETOROLAC TROMETHAMINE 30 MG: 30 INJECTION, SOLUTION INTRAMUSCULAR; INTRAVENOUS at 17:08

## 2019-10-25 NOTE — PROGRESS NOTES
"      Subjective     Chief Complaint:    Chief Complaint   Patient presents with   • Headache     Complaints of headaches, cough, and body aches. Patient states that the body aches have resolved, but the headaches have not.   • Generalized Body Aches       History of Present Illness:   Joint aches on Tuesday and Wednesday. That seems better now. Now has a headache. She has been taking excedrin around the clock since Tuesday. Headache is located around her left upper eye. No N/V. No blurry vision. + ringing in her ears. No nasal congestion. No sore throat.  Does not have history of migraines but does get \"positional tension headaches\".  Sick contact with similar symptoms earlier on this week.     Review of Systems   Constitutional: Positive for fatigue. Negative for chills and fever.   Respiratory: Negative for cough and shortness of breath.    Cardiovascular: Negative for chest pain and palpitations.   Gastrointestinal: Negative for abdominal pain, diarrhea and nausea.   Genitourinary: Negative for dysuria and frequency.   Neurological: Negative for dizziness and headache.        I have reviewed and/or updated the patient's past medical, surgical, family, social history and problem list as appropriate.     Medications:    Current Outpatient Medications:   •  acetaminophen (TYLENOL) 500 MG tablet, Take  by mouth., Disp: , Rfl:   •  aspirin-acetaminophen-caffeine (EXCEDRIN MIGRAINE) 250-250-65 MG per tablet, Take  by mouth., Disp: , Rfl:   •  azelaic acid (FINACEA) 15 % gel, Apply  topically Daily., Disp: 50 g, Rfl: 5  •  Black Cohosh-Soy-Ginkgo-Magnol (ESTROVEN MOOD & MEMORY) tablet, Take  by mouth., Disp: , Rfl:   •  Calcium Carb-Cholecalciferol (CALCIUM 500 + D3) 500-600 MG-UNIT tablet, Take  by mouth., Disp: , Rfl:   •  calcium carbonate (TUMS ULTRA 1000) 1000 MG chewable tablet, Chew., Disp: , Rfl:   •  CANASA 1000 MG suppository, UNWRAP AND INSERT 1 SUPPOSITORY RECTALLY AT BEDTIME, Disp: 30 suppository, Rfl: " "0  •  Cyanocobalamin (B-12) 5000 MCG sublingual tablet, Place  under the tongue., Disp: , Rfl:   •  desonide (DESOWEN) 0.05 % cream, APPLY TO THE AFFECTED AREA TWICE DAILY, Disp: 15 g, Rfl: 0  •  esomeprazole (NexIUM) 20 MG capsule, Take 20 mg by mouth Every Morning Before Breakfast., Disp: , Rfl:   •  FIBER DIET tablet, Take  by mouth., Disp: , Rfl:   •  fluticasone (FLONASE) 50 MCG/ACT nasal spray, 2 sprays into each nostril Daily., Disp: , Rfl:   •  lisinopril-hydrochlorothiazide (PRINZIDE,ZESTORETIC) 10-12.5 MG per tablet, TAKE 1 TABLET BY MOUTH ONCE DAILY, Disp: 30 tablet, Rfl: 5  •  Melatonin ER (MELATONIN TR) 10 MG tablet controlled-release, Take  by mouth., Disp: , Rfl:   •  Misc Natural Products ([employer] GLUCOS-CHONDROIT-MSM TS) tablet, Take  by mouth daily., Disp: , Rfl:   •  Multiple Vitamins-Minerals (MULTI FOR HER) tablet, Take  by mouth daily., Disp: , Rfl:   •  Omega 3-6-9 Fatty Acids (OMEGA 3-6-9 COMPLEX) capsule, Take  by mouth., Disp: , Rfl:   •  Probiotic Product (ALIGN EXTRA STRENGTH PO), Take  by mouth., Disp: , Rfl:   •  vitamin E 400 UNIT capsule, Take  by mouth., Disp: , Rfl:     Current Facility-Administered Medications:   •  ketorolac (TORADOL) injection 30 mg, 30 mg, Intravenous, Once, Isamar Sampson APRN    Allergies:  Allergies   Allergen Reactions   • Bee Venom Anaphylaxis       Objective     Vital Signs:   Vitals:    10/25/19 1316   BP: 120/82   Pulse: 91   Temp: 97.7 °F (36.5 °C)   SpO2: 98%   Weight: 100 kg (221 lb)   Height: 170.2 cm (67.01\")       Physical Exam:    Physical Exam   Constitutional: She is oriented to person, place, and time. She appears well-developed and well-nourished.   HENT:   Head: Normocephalic and atraumatic.   Right Ear: Tympanic membrane, external ear and ear canal normal.   Left Ear: External ear and ear canal normal.   Nose: Nose normal.   Mouth/Throat: Uvula is midline and mucous membranes are normal. Posterior oropharyngeal erythema present.   Mild " effusion on left TM.  Patient reports this is chronic.   Cardiovascular: Normal rate, regular rhythm and intact distal pulses. Exam reveals no gallop and no friction rub.   No murmur heard.  Pulmonary/Chest: Effort normal and breath sounds normal.   Abdominal: Soft. Bowel sounds are normal. There is no tenderness.   Lymphadenopathy:        Head (right side): No submental, no submandibular, no tonsillar, no preauricular and no posterior auricular adenopathy present.        Head (left side): No submental, no submandibular, no tonsillar, no preauricular and no posterior auricular adenopathy present.     She has no cervical adenopathy.   Neurological: She is alert and oriented to person, place, and time.   Skin: Skin is warm and dry.   Psychiatric: She has a normal mood and affect. Her behavior is normal.       Assessment / Plan     Assessment/Plan:   Problem List Items Addressed This Visit     None      Visit Diagnoses     Viral illness    -  Primary    Relevant Medications    ketorolac (TORADOL) injection 30 mg    Acute nonintractable headache, unspecified headache type        Relevant Medications    ketorolac (TORADOL) injection 30 mg          --Symptoms consistent with viral illness.  Have been seeing this illness with body aches this week.  She appears to be on the uphill however is having lagging headache.  --Toradol injection today  --Supportive care discussed at length    Follow up:  PRN    Electronically signed by OMAR Alonso   10/25/2019 1:30 PM      Please note that portions of this note may have been completed with a voice recognition program. Efforts were made to edit the dictations, but occasionally words are mistranscribed.

## 2019-10-28 ENCOUNTER — OFFICE VISIT (OUTPATIENT)
Dept: FAMILY MEDICINE CLINIC | Facility: CLINIC | Age: 64
End: 2019-10-28

## 2019-10-28 VITALS
SYSTOLIC BLOOD PRESSURE: 120 MMHG | TEMPERATURE: 97.8 F | BODY MASS INDEX: 34.43 KG/M2 | WEIGHT: 219.38 LBS | OXYGEN SATURATION: 99 % | HEIGHT: 67 IN | DIASTOLIC BLOOD PRESSURE: 80 MMHG | HEART RATE: 70 BPM

## 2019-10-28 DIAGNOSIS — R51.9 INTRACTABLE HEADACHE, UNSPECIFIED CHRONICITY PATTERN, UNSPECIFIED HEADACHE TYPE: Primary | ICD-10-CM

## 2019-10-28 PROCEDURE — 99214 OFFICE O/P EST MOD 30 MIN: CPT | Performed by: FAMILY MEDICINE

## 2019-10-28 RX ORDER — PREDNISONE 20 MG/1
TABLET ORAL
Qty: 14 TABLET | Refills: 0 | Status: SHIPPED | OUTPATIENT
Start: 2019-10-28 | End: 2020-02-27

## 2019-10-28 RX ORDER — TOPIRAMATE 25 MG/1
TABLET ORAL
Qty: 120 TABLET | Refills: 0 | Status: SHIPPED | OUTPATIENT
Start: 2019-10-28 | End: 2019-12-05 | Stop reason: SDUPTHER

## 2019-10-28 NOTE — PROGRESS NOTES
"Subjective   Sylvia Childress is a 64 y.o. female.     History of Present Illness  She presents today with  with c/o \"continue headache\". See initialy by Isamar 3 days ago with c/o malaise, body aches and headache. Felt to have viral illness. tx'd with toradol and supportive care rec'd. She admits she has used tylenol, excedrin, ibuprofen, etc for headache over past several days. No fever, no n/v/d. No cough/cold symptoms. No rash, no specific joint pain. HA began as generalized, now localized to right tempo-parietal area. No assoc'd neuro symptoms.    Of note she regular headaches which she has related to \"stress\" or tension. Has one at most 1 per week. occ nausea, no severe light sensitivity. Generally better with dose of Excedrin. Sister with h/o migraines. Headaches are generally one sided, throbbing. No known triggers althoughs he often feels it is affected by weather. She occ wakes up with headache.    Flu shot 2 weeks ago.    The following portions of the patient's history were reviewed and updated as appropriate: allergies, current medications, past family history, past medical history, past social history, past surgical history and problem list.    Review of Systems   Constitutional: Positive for fatigue. Negative for fever and unexpected weight change.   HENT: Negative for congestion, ear pain, mouth sores, postnasal drip, rhinorrhea and sore throat.    Eyes: Negative for visual disturbance.   Respiratory: Negative for cough, shortness of breath and wheezing.    Cardiovascular: Negative for chest pain and palpitations.   Gastrointestinal: Negative for abdominal pain, diarrhea, nausea and vomiting.   Genitourinary: Negative for dysuria and hematuria.   Musculoskeletal: Positive for arthralgias.   Skin: Negative for rash.   Neurological: Positive for dizziness (mild), weakness (generalized) and headaches. Negative for tremors, syncope, facial asymmetry, speech difficulty and numbness. "   Hematological: Negative for adenopathy. Does not bruise/bleed easily.   Psychiatric/Behavioral: Negative for confusion.       Objective    Vitals:    10/28/19 1358   BP: 120/80   Pulse: 70   Temp: 97.8 °F (36.6 °C)   SpO2: 99%     Body mass index is 34.36 kg/m².      10/28/19  1358   Weight: 99.5 kg (219 lb 6 oz)       Physical Exam   Constitutional: She is oriented to person, place, and time. She appears well-developed and well-nourished. She is cooperative. She does not appear ill. She appears distressed (mildly due to pain).   endomorphic   HENT:   Head: Normocephalic and atraumatic.   Right Ear: Tympanic membrane, external ear and ear canal normal.   Left Ear: Tympanic membrane, external ear and ear canal normal.   Nose: Nose normal.   Mouth/Throat: Oropharynx is clear and moist. Mucous membranes are not dry. No oral lesions.   Eyes: Conjunctivae, EOM and lids are normal. Pupils are equal, round, and reactive to light.   Neck: Phonation normal. Neck supple.   Cardiovascular: Normal rate, regular rhythm, normal heart sounds and intact distal pulses.   Pulmonary/Chest: Effort normal and breath sounds normal.     Vascular Status -  Her right foot exhibits no edema. Her left foot exhibits no edema.  Lymphadenopathy:        Head (right side): No submental and no submandibular adenopathy present.        Head (left side): No submental and no submandibular adenopathy present.     She has no cervical adenopathy.   Neurological: She is alert and oriented to person, place, and time. She has normal strength. She displays no tremor. No cranial nerve deficit or sensory deficit. Coordination and gait normal.   Skin: Skin is warm and dry. No bruising and no rash noted.   Psychiatric: She has a normal mood and affect. Her speech is normal and behavior is normal. Judgment and thought content normal. Cognition and memory are normal.   Nursing note and vitals reviewed.      Assessment/Plan   Sylvia was seen today for  headache.    Diagnoses and all orders for this visit:    Intractable headache, unspecified chronicity pattern, unspecified headache type    Other orders  -     predniSONE (DELTASONE) 20 MG tablet; 3 po qd x 2 days, then 2 po qd x 2 days, then 1 po qd x 2 days, then 1/2 po qd x 4 days  -     topiramate (TOPAMAX) 25 MG tablet; 1 po qhs x 1 week, then 1 po bid x 1 week, then 1 po q am and 2 po qhs x 1 week, then 2 po bid       Suspect she may have underlying migraine variant. Most likely did have viral illness at onset but may have triggered rebound headaches/daily headache with analgesic overuse. I have reviewed risks/benefits and potential side effects of various treatment options. Pt voices understanding and wishes to proceed with trial of topamax up-titration with prednisone taper. Avoid ALL otc analgesics for headache, minimal caffeine, good hydration, etc.     Keep routine f/u as scheduled, f/u sooner as needed.    Patient was encouraged to keep me informed of any acute changes, lack of improvement, or any new concerning symptoms.  Pt is aware of reasons to seek emergent care.  Patient voiced understanding of all instructions and denied further questions.

## 2019-11-06 ENCOUNTER — PROCEDURE VISIT (OUTPATIENT)
Dept: FAMILY MEDICINE CLINIC | Facility: CLINIC | Age: 64
End: 2019-11-06

## 2019-11-06 VITALS
TEMPERATURE: 97.9 F | HEIGHT: 67 IN | HEART RATE: 85 BPM | DIASTOLIC BLOOD PRESSURE: 82 MMHG | OXYGEN SATURATION: 99 % | WEIGHT: 221.5 LBS | BODY MASS INDEX: 34.76 KG/M2 | SYSTOLIC BLOOD PRESSURE: 124 MMHG

## 2019-11-06 DIAGNOSIS — D48.9 NEOPLASM OF UNCERTAIN BEHAVIOR: Primary | ICD-10-CM

## 2019-11-06 PROCEDURE — 11102 TANGNTL BX SKIN SINGLE LES: CPT | Performed by: FAMILY MEDICINE

## 2019-11-06 NOTE — PROGRESS NOTES
Subjective   Sylvia Childress is a 64 y.o. female.     History of Present Illness  Mrs. Childress presents today requesting removal of mole left neck, enalrging, getting caught on clothing, feels irritated.     The following portions of the patient's history were reviewed and updated as appropriate: allergies, current medications, past family history, past medical history, past social history, past surgical history and problem list.    Review of Systems   Constitutional: Negative for fever.   Respiratory: Negative for cough.    Cardiovascular: Negative for chest pain.   Gastrointestinal: Negative for diarrhea, nausea and vomiting.   Skin: Negative for rash.   Hematological: Does not bruise/bleed easily.   Psychiatric/Behavioral: Negative for confusion.       Objective    Vitals:    11/06/19 1312   BP: 124/82   Pulse: 85   Temp: 97.9 °F (36.6 °C)   SpO2: 99%     Body mass index is 34.69 kg/m².      11/06/19  1312   Weight: 100 kg (221 lb 8 oz)           Physical Exam   Constitutional: She is oriented to person, place, and time. She is cooperative. She does not appear ill. No distress.   Neurological: She is alert and oriented to person, place, and time.   Skin: Lesion (2 mm hyperpigmented, raised lesion left supraclavicular area/lower neck) noted. No ecchymosis noted.   Psychiatric: She has a normal mood and affect. Her behavior is normal. Cognition and memory are normal.   Nursing note and vitals reviewed.      Assessment/Plan   Sylvia was seen today for skin lesion.    Diagnoses and all orders for this visit:    Neoplasm of uncertain behavior  -     Skin / Nail Biopsy  -     Tissue Pathology Exam; Future           PROCEDURE NOTE  SHAVE BIOPSY    Reasons for procedure: enlarging irritated skin lesion left side of neck    Physical Exam Findings  Location: left lower neck (supraclavicular area)  Size: 2 mm  Features: raised, hyperpigmented, pedunculated    Informed consent obtained: Verbal- yes  /  Written-  yes    Biopsy Type: shave    Site prepped with betadine and sterile conditions maintained.    Site anesthetized with 1% Lidocaine with Epinephrine  Total amount of Lidocaine used 1ml    Shave method used: #11 blade  Hemostasis obtained with hyfrecation.    Specimen sent to pathology.    Complications: none    Pt tolerated procedure well.  Sterile dressing applied.  Wound care instructions provided.  Patient voiced understanding of all instructions and denied further questions.

## 2019-12-05 RX ORDER — TOPIRAMATE 25 MG/1
TABLET ORAL
Qty: 120 TABLET | Refills: 0 | Status: SHIPPED | OUTPATIENT
Start: 2019-12-05 | End: 2020-01-06

## 2019-12-05 RX ORDER — MESALAMINE 1000 MG/1
SUPPOSITORY RECTAL
Qty: 30 SUPPOSITORY | Refills: 0 | Status: SHIPPED | OUTPATIENT
Start: 2019-12-05 | End: 2020-01-22

## 2020-01-06 RX ORDER — TOPIRAMATE 25 MG/1
TABLET ORAL
Qty: 120 TABLET | Refills: 0 | Status: SHIPPED | OUTPATIENT
Start: 2020-01-06 | End: 2020-01-22

## 2020-01-22 RX ORDER — TOPIRAMATE 25 MG/1
TABLET ORAL
Qty: 120 TABLET | Refills: 0 | Status: SHIPPED | OUTPATIENT
Start: 2020-01-22 | End: 2020-02-26

## 2020-01-22 RX ORDER — MESALAMINE 1000 MG/1
SUPPOSITORY RECTAL
Qty: 30 SUPPOSITORY | Refills: 0 | Status: SHIPPED | OUTPATIENT
Start: 2020-01-22 | End: 2020-03-18

## 2020-01-27 RX ORDER — LISINOPRIL AND HYDROCHLOROTHIAZIDE 12.5; 1 MG/1; MG/1
TABLET ORAL
Qty: 30 TABLET | Refills: 5 | Status: SHIPPED | OUTPATIENT
Start: 2020-01-27 | End: 2020-07-20

## 2020-02-03 ENCOUNTER — TELEPHONE (OUTPATIENT)
Dept: FAMILY MEDICINE CLINIC | Facility: CLINIC | Age: 65
End: 2020-02-03

## 2020-02-03 NOTE — TELEPHONE ENCOUNTER
Maris Castillo is a Nurse Care Coordinator for the Pt. She would like the appointment notes from the in office procedure on 11/6/2019 faxed to [phone].

## 2020-02-26 RX ORDER — TOPIRAMATE 25 MG/1
TABLET ORAL
Qty: 120 TABLET | Refills: 0 | Status: SHIPPED | OUTPATIENT
Start: 2020-02-26 | End: 2020-03-23

## 2020-02-26 RX ORDER — DESONIDE 0.5 MG/G
CREAM TOPICAL
Qty: 15 G | Refills: 0 | Status: SHIPPED | OUTPATIENT
Start: 2020-02-26 | End: 2020-06-08 | Stop reason: SDUPTHER

## 2020-02-27 ENCOUNTER — TELEPHONE (OUTPATIENT)
Dept: FAMILY MEDICINE CLINIC | Facility: CLINIC | Age: 65
End: 2020-02-27

## 2020-02-27 ENCOUNTER — APPOINTMENT (OUTPATIENT)
Dept: GENERAL RADIOLOGY | Facility: HOSPITAL | Age: 65
End: 2020-02-27

## 2020-02-27 ENCOUNTER — OFFICE VISIT (OUTPATIENT)
Dept: RETAIL CLINIC | Facility: CLINIC | Age: 65
End: 2020-02-27

## 2020-02-27 ENCOUNTER — APPOINTMENT (OUTPATIENT)
Dept: CARDIOLOGY | Facility: HOSPITAL | Age: 65
End: 2020-02-27

## 2020-02-27 ENCOUNTER — HOSPITAL ENCOUNTER (OUTPATIENT)
Facility: HOSPITAL | Age: 65
Setting detail: OBSERVATION
Discharge: HOME | End: 2020-02-28
Attending: FAMILY MEDICINE | Admitting: FAMILY MEDICINE

## 2020-02-27 VITALS
HEART RATE: 105 BPM | RESPIRATION RATE: 16 BRPM | DIASTOLIC BLOOD PRESSURE: 72 MMHG | WEIGHT: 215 LBS | SYSTOLIC BLOOD PRESSURE: 114 MMHG | OXYGEN SATURATION: 96 % | TEMPERATURE: 98.3 F | BODY MASS INDEX: 33.67 KG/M2

## 2020-02-27 DIAGNOSIS — R53.1 WEAKNESS: Primary | ICD-10-CM

## 2020-02-27 DIAGNOSIS — R06.02 SHORTNESS OF BREATH: ICD-10-CM

## 2020-02-27 PROBLEM — R94.31 ABNORMAL EKG: Status: ACTIVE | Noted: 2020-02-27

## 2020-02-27 PROBLEM — R11.0 NAUSEA: Status: ACTIVE | Noted: 2020-02-27

## 2020-02-27 PROBLEM — R53.83 MALAISE AND FATIGUE: Status: ACTIVE | Noted: 2020-02-27

## 2020-02-27 PROBLEM — R07.89 CHEST DISCOMFORT: Status: ACTIVE | Noted: 2020-02-27

## 2020-02-27 PROBLEM — R07.9 CHEST PAIN IN ADULT: Status: ACTIVE | Noted: 2020-02-27

## 2020-02-27 PROBLEM — R53.81 MALAISE AND FATIGUE: Status: ACTIVE | Noted: 2020-02-27

## 2020-02-27 LAB
ALBUMIN SERPL-MCNC: 4.4 G/DL (ref 3.5–5.2)
ALBUMIN/GLOB SERPL: 1.8 G/DL
ALP SERPL-CCNC: 69 U/L (ref 39–117)
ALT SERPL W P-5'-P-CCNC: 18 U/L (ref 1–33)
ANION GAP SERPL CALCULATED.3IONS-SCNC: 12.6 MMOL/L (ref 5–15)
ANION GAP SERPL CALCULATED.3IONS-SCNC: 12.6 MMOL/L (ref 5–15)
AST SERPL-CCNC: 20 U/L (ref 1–32)
BASOPHILS # BLD AUTO: 0.04 10*3/MM3 (ref 0–0.2)
BASOPHILS NFR BLD AUTO: 0.3 % (ref 0–1.5)
BILIRUB SERPL-MCNC: 0.3 MG/DL (ref 0.2–1.2)
BUN BLD-MCNC: 15 MG/DL (ref 8–23)
BUN BLD-MCNC: 15 MG/DL (ref 8–23)
BUN/CREAT SERPL: 18.3 (ref 7–25)
BUN/CREAT SERPL: 18.3 (ref 7–25)
CALCIUM SPEC-SCNC: 9.7 MG/DL (ref 8.6–10.5)
CALCIUM SPEC-SCNC: 9.7 MG/DL (ref 8.6–10.5)
CHLORIDE SERPL-SCNC: 101 MMOL/L (ref 98–107)
CHLORIDE SERPL-SCNC: 101 MMOL/L (ref 98–107)
CK SERPL-CCNC: 56 U/L (ref 20–180)
CO2 SERPL-SCNC: 27.4 MMOL/L (ref 22–29)
CO2 SERPL-SCNC: 27.4 MMOL/L (ref 22–29)
CREAT BLD-MCNC: 0.82 MG/DL (ref 0.57–1)
CREAT BLD-MCNC: 0.82 MG/DL (ref 0.57–1)
DEPRECATED RDW RBC AUTO: 47 FL (ref 37–54)
EOSINOPHIL # BLD AUTO: 0.02 10*3/MM3 (ref 0–0.4)
EOSINOPHIL NFR BLD AUTO: 0.1 % (ref 0.3–6.2)
ERYTHROCYTE [DISTWIDTH] IN BLOOD BY AUTOMATED COUNT: 13 % (ref 12.3–15.4)
EXPIRATION DATE: NORMAL
FLUAV AG NPH QL: NEGATIVE
FLUBV AG NPH QL: NEGATIVE
GFR SERPL CREATININE-BSD FRML MDRD: 70 ML/MIN/1.73
GFR SERPL CREATININE-BSD FRML MDRD: 70 ML/MIN/1.73
GLOBULIN UR ELPH-MCNC: 2.4 GM/DL
GLUCOSE BLD-MCNC: 108 MG/DL (ref 65–99)
GLUCOSE BLD-MCNC: 108 MG/DL (ref 65–99)
HCT VFR BLD AUTO: 38.8 % (ref 34–46.6)
HGB BLD-MCNC: 12.9 G/DL (ref 12–15.9)
IMM GRANULOCYTES # BLD AUTO: 0.04 10*3/MM3 (ref 0–0.05)
IMM GRANULOCYTES NFR BLD AUTO: 0.3 % (ref 0–0.5)
INTERNAL CONTROL: NORMAL
LYMPHOCYTES # BLD AUTO: 1.36 10*3/MM3 (ref 0.7–3.1)
LYMPHOCYTES NFR BLD AUTO: 9.8 % (ref 19.6–45.3)
Lab: NORMAL
MAGNESIUM SERPL-MCNC: 1.9 MG/DL (ref 1.6–2.4)
MCH RBC QN AUTO: 32.7 PG (ref 26.6–33)
MCHC RBC AUTO-ENTMCNC: 33.2 G/DL (ref 31.5–35.7)
MCV RBC AUTO: 98.2 FL (ref 79–97)
MONOCYTES # BLD AUTO: 0.7 10*3/MM3 (ref 0.1–0.9)
MONOCYTES NFR BLD AUTO: 5.1 % (ref 5–12)
NEUTROPHILS # BLD AUTO: 11.67 10*3/MM3 (ref 1.7–7)
NEUTROPHILS NFR BLD AUTO: 84.4 % (ref 42.7–76)
NRBC BLD AUTO-RTO: 0 /100 WBC (ref 0–0.2)
PLATELET # BLD AUTO: 187 10*3/MM3 (ref 140–450)
PMV BLD AUTO: 9.8 FL (ref 6–12)
POTASSIUM BLD-SCNC: 3.3 MMOL/L (ref 3.5–5.2)
POTASSIUM BLD-SCNC: 3.3 MMOL/L (ref 3.5–5.2)
PROT SERPL-MCNC: 6.8 G/DL (ref 6–8.5)
RBC # BLD AUTO: 3.95 10*6/MM3 (ref 3.77–5.28)
SODIUM BLD-SCNC: 141 MMOL/L (ref 136–145)
SODIUM BLD-SCNC: 141 MMOL/L (ref 136–145)
T4 FREE SERPL-MCNC: 1.03 NG/DL (ref 0.93–1.7)
TROPONIN T SERPL-MCNC: <0.01 NG/ML (ref 0–0.03)
TROPONIN T SERPL-MCNC: <0.01 NG/ML (ref 0–0.03)
TSH SERPL DL<=0.05 MIU/L-ACNC: 0.62 UIU/ML (ref 0.27–4.2)
WBC NRBC COR # BLD: 13.83 10*3/MM3 (ref 3.4–10.8)

## 2020-02-27 PROCEDURE — 71046 X-RAY EXAM CHEST 2 VIEWS: CPT

## 2020-02-27 PROCEDURE — G0379 DIRECT REFER HOSPITAL OBSERV: HCPCS

## 2020-02-27 PROCEDURE — G0378 HOSPITAL OBSERVATION PER HR: HCPCS

## 2020-02-27 PROCEDURE — 84484 ASSAY OF TROPONIN QUANT: CPT | Performed by: FAMILY MEDICINE

## 2020-02-27 PROCEDURE — 99220 PR INITIAL OBSERVATION CARE/DAY 70 MINUTES: CPT | Performed by: FAMILY MEDICINE

## 2020-02-27 PROCEDURE — 99213 OFFICE O/P EST LOW 20 MIN: CPT | Performed by: NURSE PRACTITIONER

## 2020-02-27 PROCEDURE — 99214 OFFICE O/P EST MOD 30 MIN: CPT | Performed by: INTERNAL MEDICINE

## 2020-02-27 PROCEDURE — 80053 COMPREHEN METABOLIC PANEL: CPT | Performed by: FAMILY MEDICINE

## 2020-02-27 PROCEDURE — 87804 INFLUENZA ASSAY W/OPTIC: CPT | Performed by: NURSE PRACTITIONER

## 2020-02-27 PROCEDURE — 93005 ELECTROCARDIOGRAM TRACING: CPT | Performed by: FAMILY MEDICINE

## 2020-02-27 PROCEDURE — 93307 TTE W/O DOPPLER COMPLETE: CPT | Performed by: INTERNAL MEDICINE

## 2020-02-27 PROCEDURE — 83735 ASSAY OF MAGNESIUM: CPT | Performed by: FAMILY MEDICINE

## 2020-02-27 PROCEDURE — 85025 COMPLETE CBC W/AUTO DIFF WBC: CPT | Performed by: FAMILY MEDICINE

## 2020-02-27 PROCEDURE — 84439 ASSAY OF FREE THYROXINE: CPT | Performed by: FAMILY MEDICINE

## 2020-02-27 PROCEDURE — 82550 ASSAY OF CK (CPK): CPT | Performed by: FAMILY MEDICINE

## 2020-02-27 PROCEDURE — 84443 ASSAY THYROID STIM HORMONE: CPT | Performed by: FAMILY MEDICINE

## 2020-02-27 PROCEDURE — 80048 BASIC METABOLIC PNL TOTAL CA: CPT | Performed by: FAMILY MEDICINE

## 2020-02-27 PROCEDURE — 93306 TTE W/DOPPLER COMPLETE: CPT

## 2020-02-27 RX ORDER — SODIUM CHLORIDE 0.9 % (FLUSH) 0.9 %
10 SYRINGE (ML) INJECTION AS NEEDED
Status: DISCONTINUED | OUTPATIENT
Start: 2020-02-27 | End: 2020-02-28 | Stop reason: HOSPADM

## 2020-02-27 RX ORDER — ACETAMINOPHEN 325 MG/1
325 TABLET ORAL EVERY 6 HOURS PRN
Status: DISCONTINUED | OUTPATIENT
Start: 2020-02-27 | End: 2020-02-28 | Stop reason: HOSPADM

## 2020-02-27 RX ORDER — FAMOTIDINE 20 MG/1
20 TABLET, FILM COATED ORAL
Status: DISCONTINUED | OUTPATIENT
Start: 2020-02-27 | End: 2020-02-28 | Stop reason: HOSPADM

## 2020-02-27 RX ORDER — ONDANSETRON 2 MG/ML
4 INJECTION INTRAMUSCULAR; INTRAVENOUS EVERY 6 HOURS PRN
Status: DISCONTINUED | OUTPATIENT
Start: 2020-02-27 | End: 2020-02-28 | Stop reason: HOSPADM

## 2020-02-27 RX ORDER — NALOXONE HCL 0.4 MG/ML
0.4 VIAL (ML) INJECTION
Status: DISCONTINUED | OUTPATIENT
Start: 2020-02-27 | End: 2020-02-28 | Stop reason: HOSPADM

## 2020-02-27 RX ORDER — ONDANSETRON 4 MG/1
4 TABLET, FILM COATED ORAL EVERY 6 HOURS PRN
Status: DISCONTINUED | OUTPATIENT
Start: 2020-02-27 | End: 2020-02-28 | Stop reason: HOSPADM

## 2020-02-27 RX ORDER — TOPIRAMATE 25 MG/1
25 TABLET ORAL EVERY 12 HOURS SCHEDULED
Status: DISCONTINUED | OUTPATIENT
Start: 2020-02-28 | End: 2020-02-28 | Stop reason: HOSPADM

## 2020-02-27 RX ORDER — HYDROCODONE BITARTRATE AND ACETAMINOPHEN 5; 325 MG/1; MG/1
1 TABLET ORAL
COMMUNITY
Start: 2020-01-04 | End: 2020-06-08

## 2020-02-27 RX ORDER — SODIUM CHLORIDE 9 MG/ML
50 INJECTION, SOLUTION INTRAVENOUS CONTINUOUS
Status: DISCONTINUED | OUTPATIENT
Start: 2020-02-27 | End: 2020-02-28 | Stop reason: HOSPADM

## 2020-02-27 RX ORDER — MORPHINE SULFATE 2 MG/ML
1 INJECTION, SOLUTION INTRAMUSCULAR; INTRAVENOUS EVERY 4 HOURS PRN
Status: DISCONTINUED | OUTPATIENT
Start: 2020-02-27 | End: 2020-02-28 | Stop reason: HOSPADM

## 2020-02-27 RX ORDER — MULTIPLE VITAMINS W/ MINERALS TAB 9MG-400MCG
1 TAB ORAL DAILY
Status: DISCONTINUED | OUTPATIENT
Start: 2020-02-28 | End: 2020-02-28 | Stop reason: HOSPADM

## 2020-02-27 RX ORDER — SODIUM CHLORIDE 0.9 % (FLUSH) 0.9 %
10 SYRINGE (ML) INJECTION EVERY 12 HOURS SCHEDULED
Status: DISCONTINUED | OUTPATIENT
Start: 2020-02-27 | End: 2020-02-28 | Stop reason: HOSPADM

## 2020-02-27 RX ADMIN — ACETAMINOPHEN 325 MG: 325 TABLET, FILM COATED ORAL at 19:46

## 2020-02-27 RX ADMIN — FAMOTIDINE 20 MG: 20 TABLET ORAL at 17:38

## 2020-02-27 RX ADMIN — SODIUM CHLORIDE 50 ML/HR: 9 INJECTION, SOLUTION INTRAVENOUS at 14:52

## 2020-02-27 NOTE — PROGRESS NOTES
Subjective   Sylvia Childress is a 64 y.o. female.     Patient presents to clinic reporting weakness and feeling tired. States if it is the flu she would like to be tested now. Patient reports waking up at 5am this morning with heartburn and shortness or breath. States this only lasted a few minutes but this is not normal for her. Takes heartburn medicine and cannot remember the last time she had heartburn due to how well it is controlled. Unsure of why she had shortness of breath but relates it to her heartburn. Patient states since then she has become weak and very tired feeling with aches and chills. 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 Went to see podiatrist this morning for a follow-up and states she started feeling worse so she took aspirin which helped her feel a little better. While checking in with our , she reported that she was so tired she could take a nap before provider evaluated her.     URI    This is a new problem. The current episode started today (woke up feeling bad at 0500). There has been no fever (patient reports she runs colder than 98.6). Associated symptoms include congestion (chronic), coughing, nausea and rhinorrhea (chronic). Pertinent negatives include no abdominal pain, chest pain, diarrhea, ear pain, headaches, sinus pain, sore throat or vomiting. Associated symptoms comments: Chills, bodyaches, lower back pain, left shoulder pain that she reports is ongoing but concerns her today. Treatments tried: aspirin. The treatment provided mild relief.        Current Outpatient Medications on File Prior to Visit   Medication Sig Dispense Refill   • acetaminophen (TYLENOL) 500 MG tablet Take  by mouth.     • aspirin-acetaminophen-caffeine (EXCEDRIN MIGRAINE) 250-250-65 MG per tablet Take  by mouth.     • azelaic acid (FINACEA) 15 % gel Apply  topically Daily. 50 g 5   • Black  Cohosh-Soy-Ginkgo-Magnol (ESTROVEN MOOD & MEMORY) tablet Take  by mouth.     • Calcium Carb-Cholecalciferol (CALCIUM 500 + D3) 500-600 MG-UNIT tablet Take  by mouth.     • calcium carbonate (TUMS ULTRA 1000) 1000 MG chewable tablet Chew.     • Cyanocobalamin (B-12) 5000 MCG sublingual tablet Place  under the tongue.     • desonide (DESOWEN) 0.05 % cream APPLY TO THE AFFECTED AREA TWICE DAILY 15 g 0   • esomeprazole (NexIUM) 20 MG capsule Take 20 mg by mouth Every Morning Before Breakfast.     • FIBER DIET tablet Take  by mouth.     • fluticasone (FLONASE) 50 MCG/ACT nasal spray 2 sprays into each nostril Daily.     • HYDROcodone-acetaminophen (NORCO) 5-325 MG per tablet TK 1 TO 2 T PO Q DAY PRN     • lisinopril-hydrochlorothiazide (PRINZIDE,ZESTORETIC) 10-12.5 MG per tablet TAKE 1 TABLET BY MOUTH ONCE DAILY 30 tablet 5   • Melatonin ER (MELATONIN TR) 10 MG tablet controlled-release Take  by mouth.     • mesalamine (CANASA) 1000 MG suppository UNWRAP AND INSERT 1 SUPPOSITORY RECTALLY AT BEDTIME 30 suppository 0   • Misc Natural Products ([employer] GLUCOS-CHONDROIT-MSM TS) tablet Take  by mouth daily.     • Multiple Vitamins-Minerals (MULTI FOR HER) tablet Take  by mouth daily.     • Omega 3-6-9 Fatty Acids (OMEGA 3-6-9 COMPLEX) capsule Take  by mouth.     • Probiotic Product (ALIGN EXTRA STRENGTH PO) Take  by mouth.     • topiramate (TOPAMAX) 25 MG tablet TAKE 2 TABLETS BY MOUTH TWICE DAILY 120 tablet 0   • vitamin E 400 UNIT capsule Take  by mouth.     • [DISCONTINUED] predniSONE (DELTASONE) 20 MG tablet 3 po qd x 2 days, then 2 po qd x 2 days, then 1 po qd x 2 days, then 1/2 po qd x 4 days 14 tablet 0     No current facility-administered medications on file prior to visit.        Allergies   Allergen Reactions   • Bee Venom Anaphylaxis       Past Medical History:   Diagnosis Date   • Arthritis    • Cervical disc disease    • Chronic sinusitis    • Diverticulitis    • Fracture     left ankle in 2011 s/p ORIF 12/2011 per  Dr. Orosco   • GERD (gastroesophageal reflux disease)    • Joint laxity    • Laryngotracheobronchitis    • Osteopenia    • Patellar dislocation     left knee   • Restless sleeper    • Rosacea    • Strain of right knee    • Ulcerative proctitis (CMS/HCC)        Past Surgical History:   Procedure Laterality Date   • ANKLE SURGERY      2011   • BREAST BIOPSY      left   • COLONOSCOPY  06/24/2011    dr alvarado 6/2011       Family History   Family history unknown: Yes   Problem Relation Age of Onset   • Family history unknown: Yes   • No Known Problems Other        Social History     Socioeconomic History   • Marital status:      Spouse name: Not on file   • Number of children: Not on file   • Years of education: Not on file   • Highest education level: Not on file   Tobacco Use   • Smoking status: Never Smoker   • Smokeless tobacco: Never Used   Substance and Sexual Activity   • Alcohol use: Yes     Comment: 2 per month   • Drug use: No       Review of Systems   Constitutional: Positive for activity change, appetite change and chills. Negative for fever (unknown).   HENT: Positive for congestion (chronic) and rhinorrhea (chronic). Negative for ear pain, sinus pain and sore throat.    Respiratory: Positive for cough and shortness of breath (this morning upon awakening with heartburn but denies now). Negative for chest tightness.    Cardiovascular: Negative for chest pain and palpitations.   Gastrointestinal: Positive for nausea. Negative for abdominal pain, diarrhea and vomiting.        Heartburn upon awakening this morning, denies now   Musculoskeletal: Positive for myalgias.   Neurological: Negative for dizziness and headaches.       /72   Pulse 105   Temp 98.3 °F (36.8 °C)   Resp 16   Wt 97.5 kg (215 lb)   SpO2 96%   BMI 33.67 kg/m²     Objective   Physical Exam   Constitutional: She is oriented to person, place, and time. She appears well-developed and well-nourished. She is cooperative.   Patient  appears very tired and wanted to lie down on exam table during visit   HENT:   Head: Normocephalic.   Right Ear: External ear and ear canal normal. Tympanic membrane is erythematous (mild).   Left Ear: Tympanic membrane, external ear and ear canal normal.   Nose: Right sinus exhibits no maxillary sinus tenderness and no frontal sinus tenderness. Left sinus exhibits no maxillary sinus tenderness and no frontal sinus tenderness.   Mouth/Throat: Uvula is midline, oropharynx is clear and moist and mucous membranes are normal. No tonsillar exudate.   Eyes: Conjunctivae, EOM and lids are normal.   Cardiovascular: Regular rhythm and normal heart sounds. Tachycardia present.   Pulmonary/Chest: Effort normal and breath sounds normal. No accessory muscle usage. No respiratory distress.   Occasional cough during visit   Lymphadenopathy:     She has no cervical adenopathy.   Neurological: She is alert and oriented to person, place, and time.   Skin: Skin is warm, dry and intact.   Psychiatric: She has a normal mood and affect. Her speech is normal and behavior is normal.         Assessment/Plan   Sylvia was seen today for uri.    Diagnoses and all orders for this visit:    Weakness  -     POCT Influenza A/B    Shortness of breath-resolved        Results for orders placed or performed in visit on 02/27/20   POCT Influenza A/B   Result Value Ref Range    Rapid Influenza A Ag Negative Negative    Rapid Influenza B Ag Negative Negative    Internal Control Passed Passed    Lot Number [id number]     Expiration Date 05/6/22      Made appt with Dr. Mojica at 11:45 today for further evaluation of symptoms. Patient refused to go to ER for cardiac evaluation.   Discussed with patient likelihood of flu but too early to test positive. Patient reports she tries not to be a hypochondriac but she is concerned about the heartburn and shortness of breath she experienced this morning, as well as back and shoulder pain even though this has been  ongoing. Patient again refused to go to the ER and states she will keep appt at PCP office.   Go to the nearest emergency room if symptoms worsen prior to seeing Dr. Mojica.

## 2020-02-28 VITALS
RESPIRATION RATE: 18 BRPM | WEIGHT: 210 LBS | HEIGHT: 67 IN | BODY MASS INDEX: 32.96 KG/M2 | DIASTOLIC BLOOD PRESSURE: 70 MMHG | OXYGEN SATURATION: 96 % | SYSTOLIC BLOOD PRESSURE: 111 MMHG | TEMPERATURE: 98.9 F | HEART RATE: 74 BPM

## 2020-02-28 PROBLEM — R07.89 CHEST DISCOMFORT: Status: RESOLVED | Noted: 2020-02-27 | Resolved: 2020-02-28

## 2020-02-28 PROBLEM — R53.81 MALAISE AND FATIGUE: Status: RESOLVED | Noted: 2020-02-27 | Resolved: 2020-02-28

## 2020-02-28 PROBLEM — R06.02 SHORTNESS OF BREATH: Status: RESOLVED | Noted: 2020-02-27 | Resolved: 2020-02-28

## 2020-02-28 PROBLEM — R11.0 NAUSEA: Status: RESOLVED | Noted: 2020-02-27 | Resolved: 2020-02-28

## 2020-02-28 PROBLEM — R53.83 MALAISE AND FATIGUE: Status: RESOLVED | Noted: 2020-02-27 | Resolved: 2020-02-28

## 2020-02-28 PROBLEM — R94.31 ABNORMAL EKG: Status: RESOLVED | Noted: 2020-02-27 | Resolved: 2020-02-28

## 2020-02-28 PROBLEM — R07.9 CHEST PAIN IN ADULT: Status: RESOLVED | Noted: 2020-02-27 | Resolved: 2020-02-28

## 2020-02-28 LAB
ANION GAP SERPL CALCULATED.3IONS-SCNC: 10 MMOL/L (ref 5–15)
BASOPHILS # BLD AUTO: 0.03 10*3/MM3 (ref 0–0.2)
BASOPHILS NFR BLD AUTO: 0.2 % (ref 0–1.5)
BH CV ECHO MEAS - IVSD: 0.9 CM
BH CV ECHO MEAS - LA DIMENSION: 2.8 CM
BH CV ECHO MEAS - LVPWD: 0.8 CM
BH CV ECHO MEAS - TAPSE (>1.6): 2 CM2
BUN BLD-MCNC: 12 MG/DL (ref 8–23)
BUN/CREAT SERPL: 16.2 (ref 7–25)
CALCIUM SPEC-SCNC: 8.7 MG/DL (ref 8.6–10.5)
CHLORIDE SERPL-SCNC: 108 MMOL/L (ref 98–107)
CO2 SERPL-SCNC: 25 MMOL/L (ref 22–29)
CREAT BLD-MCNC: 0.74 MG/DL (ref 0.57–1)
DEPRECATED RDW RBC AUTO: 47.3 FL (ref 37–54)
EOSINOPHIL # BLD AUTO: 0.26 10*3/MM3 (ref 0–0.4)
EOSINOPHIL NFR BLD AUTO: 2.2 % (ref 0.3–6.2)
ERYTHROCYTE [DISTWIDTH] IN BLOOD BY AUTOMATED COUNT: 13.2 % (ref 12.3–15.4)
GFR SERPL CREATININE-BSD FRML MDRD: 79 ML/MIN/1.73
GLUCOSE BLD-MCNC: 104 MG/DL (ref 65–99)
HCT VFR BLD AUTO: 34.1 % (ref 34–46.6)
HGB BLD-MCNC: 11.5 G/DL (ref 12–15.9)
IMM GRANULOCYTES # BLD AUTO: 0.05 10*3/MM3 (ref 0–0.05)
IMM GRANULOCYTES NFR BLD AUTO: 0.4 % (ref 0–0.5)
LV EF 2D ECHO EST: 65 %
LYMPHOCYTES # BLD AUTO: 2.97 10*3/MM3 (ref 0.7–3.1)
LYMPHOCYTES NFR BLD AUTO: 24.6 % (ref 19.6–45.3)
MAXIMAL PREDICTED HEART RATE: 157 BPM
MCH RBC QN AUTO: 32.8 PG (ref 26.6–33)
MCHC RBC AUTO-ENTMCNC: 33.7 G/DL (ref 31.5–35.7)
MCV RBC AUTO: 97.2 FL (ref 79–97)
MONOCYTES # BLD AUTO: 0.67 10*3/MM3 (ref 0.1–0.9)
MONOCYTES NFR BLD AUTO: 5.6 % (ref 5–12)
NEUTROPHILS # BLD AUTO: 8.07 10*3/MM3 (ref 1.7–7)
NEUTROPHILS NFR BLD AUTO: 67 % (ref 42.7–76)
NRBC BLD AUTO-RTO: 0 /100 WBC (ref 0–0.2)
PLATELET # BLD AUTO: 187 10*3/MM3 (ref 140–450)
PMV BLD AUTO: 10.4 FL (ref 6–12)
POTASSIUM BLD-SCNC: 3.1 MMOL/L (ref 3.5–5.2)
RBC # BLD AUTO: 3.51 10*6/MM3 (ref 3.77–5.28)
SODIUM BLD-SCNC: 143 MMOL/L (ref 136–145)
STRESS TARGET HR: 133 BPM
TROPONIN T SERPL-MCNC: <0.01 NG/ML (ref 0–0.03)
WBC NRBC COR # BLD: 12.05 10*3/MM3 (ref 3.4–10.8)

## 2020-02-28 PROCEDURE — 84484 ASSAY OF TROPONIN QUANT: CPT | Performed by: FAMILY MEDICINE

## 2020-02-28 PROCEDURE — 96361 HYDRATE IV INFUSION ADD-ON: CPT

## 2020-02-28 PROCEDURE — 99217 PR OBSERVATION CARE DISCHARGE MANAGEMENT: CPT | Performed by: FAMILY MEDICINE

## 2020-02-28 PROCEDURE — 25010000002 AZITHROMYCIN 500 MG/250 ML: Performed by: FAMILY MEDICINE

## 2020-02-28 PROCEDURE — 85025 COMPLETE CBC W/AUTO DIFF WBC: CPT | Performed by: FAMILY MEDICINE

## 2020-02-28 PROCEDURE — 96365 THER/PROPH/DIAG IV INF INIT: CPT

## 2020-02-28 PROCEDURE — 25010000002 CEFTRIAXONE SODIUM-DEXTROSE 1-3.74 GM-%(50ML) RECONSTITUTED SOLUTION: Performed by: FAMILY MEDICINE

## 2020-02-28 PROCEDURE — 80048 BASIC METABOLIC PNL TOTAL CA: CPT | Performed by: FAMILY MEDICINE

## 2020-02-28 PROCEDURE — G0378 HOSPITAL OBSERVATION PER HR: HCPCS

## 2020-02-28 PROCEDURE — 99213 OFFICE O/P EST LOW 20 MIN: CPT | Performed by: INTERNAL MEDICINE

## 2020-02-28 RX ORDER — POTASSIUM CHLORIDE 750 MG/1
40 CAPSULE, EXTENDED RELEASE ORAL 2 TIMES DAILY WITH MEALS
Status: DISCONTINUED | OUTPATIENT
Start: 2020-02-28 | End: 2020-02-28

## 2020-02-28 RX ORDER — POTASSIUM CHLORIDE 750 MG/1
40 CAPSULE, EXTENDED RELEASE ORAL ONCE
Status: DISCONTINUED | OUTPATIENT
Start: 2020-02-28 | End: 2020-02-28 | Stop reason: HOSPADM

## 2020-02-28 RX ORDER — AMOXICILLIN AND CLAVULANATE POTASSIUM 875; 125 MG/1; MG/1
1 TABLET, FILM COATED ORAL 2 TIMES DAILY
Qty: 12 TABLET | Refills: 0 | Status: SHIPPED | OUTPATIENT
Start: 2020-02-28 | End: 2020-03-05

## 2020-02-28 RX ORDER — CEFTRIAXONE 1 G/50ML
1 INJECTION, SOLUTION INTRAVENOUS ONCE
Status: COMPLETED | OUTPATIENT
Start: 2020-02-28 | End: 2020-02-28

## 2020-02-28 RX ADMIN — MULTIPLE VITAMINS W/ MINERALS TAB 1 TABLET: TAB at 09:02

## 2020-02-28 RX ADMIN — CEFTRIAXONE 1 G: 1 INJECTION, SOLUTION INTRAVENOUS at 09:01

## 2020-02-28 RX ADMIN — SODIUM CHLORIDE 500 ML: 9 INJECTION, SOLUTION INTRAVENOUS at 00:55

## 2020-02-28 RX ADMIN — SODIUM CHLORIDE 500 ML: 9 INJECTION, SOLUTION INTRAVENOUS at 00:01

## 2020-02-28 RX ADMIN — TOPIRAMATE 25 MG: 25 TABLET, FILM COATED ORAL at 09:02

## 2020-02-28 RX ADMIN — LISINOPRIL: 10 TABLET ORAL at 09:02

## 2020-02-28 RX ADMIN — SODIUM CHLORIDE, PRESERVATIVE FREE 10 ML: 5 INJECTION INTRAVENOUS at 09:01

## 2020-02-28 RX ADMIN — FAMOTIDINE 20 MG: 20 TABLET ORAL at 06:30

## 2020-02-28 RX ADMIN — AZITHROMYCIN 500 MG: 500 INJECTION, POWDER, LYOPHILIZED, FOR SOLUTION INTRAVENOUS at 10:42

## 2020-02-28 RX ADMIN — POTASSIUM CHLORIDE 40 MEQ: 750 CAPSULE, EXTENDED RELEASE ORAL at 10:43

## 2020-02-28 RX ADMIN — ACETAMINOPHEN 325 MG: 325 TABLET, FILM COATED ORAL at 09:01

## 2020-03-02 NOTE — TELEPHONE ENCOUNTER
Pt was seen at [facility], returned same day, and was seen Dr Mojica for these sx's.

## 2020-03-03 ENCOUNTER — TRANSITIONAL CARE MANAGEMENT TELEPHONE ENCOUNTER (OUTPATIENT)
Dept: CALL CENTER | Facility: HOSPITAL | Age: 65
End: 2020-03-03

## 2020-03-03 NOTE — OUTREACH NOTE
TCM call completed.  Spoke with , he states that pt is doing well and has been resting, no questions or concerns for the office at this time.  Patient was able to get her antibiotic filled and has been taking it as directed.  Reviewed signs and symptoms of when to call MD or go to ER.  Reviewed follow up appts, pt will see Dr. Powell tomorrow 3/4 at 1:15 pm.

## 2020-03-04 ENCOUNTER — OFFICE VISIT (OUTPATIENT)
Dept: FAMILY MEDICINE CLINIC | Facility: CLINIC | Age: 65
End: 2020-03-04

## 2020-03-04 VITALS
HEIGHT: 67 IN | TEMPERATURE: 98 F | OXYGEN SATURATION: 99 % | DIASTOLIC BLOOD PRESSURE: 90 MMHG | BODY MASS INDEX: 33.33 KG/M2 | HEART RATE: 84 BPM | WEIGHT: 212.38 LBS | SYSTOLIC BLOOD PRESSURE: 124 MMHG

## 2020-03-04 DIAGNOSIS — E87.6 HYPOKALEMIA: ICD-10-CM

## 2020-03-04 DIAGNOSIS — J18.9 COMMUNITY ACQUIRED PNEUMONIA OF RIGHT MIDDLE LOBE OF LUNG: Primary | ICD-10-CM

## 2020-03-04 DIAGNOSIS — I10 ESSENTIAL HYPERTENSION: ICD-10-CM

## 2020-03-04 DIAGNOSIS — G43.009 MIGRAINE WITHOUT AURA AND WITHOUT STATUS MIGRAINOSUS, NOT INTRACTABLE: ICD-10-CM

## 2020-03-04 PROCEDURE — 99496 TRANSJ CARE MGMT HIGH F2F 7D: CPT | Performed by: FAMILY MEDICINE

## 2020-03-04 RX ORDER — POTASSIUM CHLORIDE 750 MG/1
10 TABLET, FILM COATED, EXTENDED RELEASE ORAL 2 TIMES DAILY
Qty: 60 TABLET | Refills: 2 | Status: SHIPPED | OUTPATIENT
Start: 2020-03-04 | End: 2020-06-08

## 2020-03-04 NOTE — PROGRESS NOTES
Transitional Care Follow Up Visit  Subjective     Sylvia Childress is a 64 y.o. female who presents for a transitional care management visit.    Within 48 business hours after discharge our office contacted her via telephone to coordinate her care and needs.      I reviewed and discussed the details of that call along with the discharge summary, hospital problems, inpatient lab results, inpatient diagnostic studies, and consultation reports with Sylvia.     Current outpatient and discharge medications have been reconciled for the patient.  Reviewed by: Megan Powell MD      Date of TCM Phone Call 3/3/2020   [facility]   Date of Admission 2/27/2020   Date of Discharge 2/28/2020   Discharge Disposition Home or Self Care     Risk for Readmission (LACE) Score: 3 (2/28/2020  6:00 AM)      History of Present Illness   Course During Hospital Stay: Mrs. Childress was admitted to [facility] on 2/27/2020, discharged 2/48/20.  She presented with chest pain.  Was diagnosed with community-acquired pneumonia.  Treated per protocol with IV azithromycin and Rocephin.  No associated complications.  She was discharged on Augmentin course of which she is currently completing.  Had cardiology consultation due to atypical chest pain.  Cardiac eval was unremarkable.    Since discharge she has had significant improvement in fatigue/malaise.  No fever.  Mild intermittent dry cough.  No further chest pain.    She will be due for follow-up chest x-ray in approximately 1 month.  Had mild hypokalemia during admission.  Follow-up eval needed.    Her main concern today is that of increasing frequency of migraine headaches even prior to admission for her acute illness.  She is currently on Topamax for migraine prevention.  Taking total of 50 mg twice daily.  Denies side effects.  No new focal neurological symptoms associated with headaches.    She has essential hypertension for which she is currently taking  Zestoretic.  Taking as prescribed.  No side effects.  Blood pressure has been well controlled.    The following portions of the patient's history were reviewed and updated as appropriate: allergies, current medications, past family history, past medical history, past social history, past surgical history and problem list.    Review of Systems   Constitutional: Positive for fatigue. Negative for fever and unexpected weight change.   HENT: Negative for congestion, ear pain, mouth sores, postnasal drip, rhinorrhea and sore throat.    Eyes: Negative for visual disturbance.   Respiratory: Positive for cough. Negative for shortness of breath and wheezing.    Cardiovascular: Negative for chest pain and palpitations.   Gastrointestinal: Negative for abdominal pain, diarrhea, nausea and vomiting.   Genitourinary: Negative for dysuria and hematuria.   Musculoskeletal: Positive for arthralgias.   Skin: Negative for rash.   Neurological: Positive for headaches. Negative for dizziness, tremors, syncope, facial asymmetry, speech difficulty, weakness and numbness.   Hematological: Negative for adenopathy. Does not bruise/bleed easily.   Psychiatric/Behavioral: Negative for confusion.   Pt's previous ROS reviewed and updated as indicated.       Objective    Vitals:    03/04/20 1325   BP: 124/90   Pulse: 84   Temp: 98 °F (36.7 °C)   SpO2: 99%     Body mass index is 33.26 kg/m².      03/04/20  1325   Weight: 96.3 kg (212 lb 6 oz)       Physical Exam   Constitutional: She is oriented to person, place, and time. She appears well-developed and well-nourished. She is cooperative. She does not appear ill. No distress.   overweight   HENT:   Head: Normocephalic and atraumatic.   Mouth/Throat: Mucous membranes are normal. Mucous membranes are not dry.   Eyes: Conjunctivae and lids are normal.   Neck: Phonation normal. Neck supple. Normal carotid pulses present. No thyroid mass present.   Cardiovascular: Normal rate, regular rhythm, normal  heart sounds and intact distal pulses.   Pulmonary/Chest: Effort normal and breath sounds normal.     Vascular Status -  Her right foot exhibits no edema. Her left foot exhibits no edema.  Lymphadenopathy:     She has no cervical adenopathy.   Neurological: She is alert and oriented to person, place, and time. She has normal strength. She displays no tremor. No cranial nerve deficit or sensory deficit. Gait normal.   Skin: Skin is warm and dry. No bruising and no rash noted.   Psychiatric: She has a normal mood and affect. Her behavior is normal. Cognition and memory are normal.   Nursing note and vitals reviewed.        Lab Results   Component Value Date    WBC 12.05 (H) 02/28/2020    HGB 11.5 (L) 02/28/2020    HCT 34.1 02/28/2020    MCV 97.2 (H) 02/28/2020     02/28/2020       Lab Results   Component Value Date    GLUCOSE 104 (H) 02/28/2020    BUN 12 02/28/2020    CREATININE 0.74 02/28/2020    EGFRIFNONA 79 02/28/2020    EGFRIFAFRI 103 05/14/2019    BCR 16.2 02/28/2020    K 3.1 (L) 02/28/2020    CO2 25.0 02/28/2020    CALCIUM 8.7 02/28/2020    PROTENTOTREF 7.0 05/14/2019    ALBUMIN 4.40 02/27/2020    LABIL2 1.4 05/14/2019    AST 20 02/27/2020    ALT 18 02/27/2020       Lab Results   Component Value Date    CHOL 185 10/20/2016    CHLPL 192 08/24/2018    TRIG 173 (H) 08/24/2018    HDL 48 08/24/2018     (H) 08/24/2018       Lab Results   Component Value Date    HGBA1C 6.10 08/24/2018       Lab Results   Component Value Date    TSH 0.623 02/27/2020       Lab Results   Component Value Date    CKTOTAL 56 02/27/2020    TROPONINT <0.010 02/28/2020     Xr Chest Pa & Lateral    Result Date: 2/27/2020  Patchy infiltrate in the right middle lobe concerning for developing pneumonia. Short-term follow-up to resolution recommended.  This report was finalized on 2/27/2020 2:43 PM by Souleymane Aggarwla MD.    Results for orders placed during the hospital encounter of 02/27/20   Adult Transthoracic Echo Complete W/ Cont if  Necessary Per Protocol    Narrative · Estimated EF = 65%.  · Left ventricular systolic function is normal.            Assessment/Plan   Sylvia was seen today for pneumonia and hypertension.    Diagnoses and all orders for this visit:    Community acquired pneumonia of right middle lobe of lung (CMS/HCC)  -     XR Chest PA & Lateral; Future    Migraine without aura and without status migrainosus, not intractable    Essential hypertension  -     Basic Metabolic Panel; Future  -     Magnesium; Future    Hypokalemia  -     Basic Metabolic Panel; Future  -     Magnesium; Future    Other orders  -     potassium chloride (K-DUR) 10 MEQ CR tablet; Take 1 tablet by mouth 2 (Two) Times a Day.      Community-acquired pneumonia appears to be resolving well.  She is rating her clinical baseline.  Encouraged to continue Augmentin as instructed, continue incentive spirometry.  She will have follow-up chest x-ray in 1 month.    Migraine headaches with increased frequency but no increase in severity or new associated neurological symptoms.  She will uptitrate Topamax to a goal of 200 mg daily.  Re-/benefits and potential side effects once again reviewed patient voiced understanding wished to proceed with treatment.  If she has minimal improvement in headaches consider alternative prophylactic medication.    Hypertension controlled, continue Zestoretic.    Hypokalemia during admission.  Continue potassium replacement and recheck in 1 to 2 weeks.    Routine follow-up in 3 months, sooner as needed/instructed.  I will contact patient regarding test results and provide instructions regarding any necessary changes in plan of care.  Patient was encouraged to keep me informed of any acute changes, lack of improvement, or any new concerning symptoms.  Pt is aware of reasons to seek emergent care.  Patient voiced understanding of all instructions and denied further questions.      This transition of care visit required high complexity medical  decision making and took place within 1 week of discharge.          Please note that portions of this note may have been completed with a voice recognition program. Efforts were made to edit the dictations, but occasionally words are mistranscribed.

## 2020-03-06 ENCOUNTER — TELEPHONE (OUTPATIENT)
Dept: FAMILY MEDICINE CLINIC | Facility: CLINIC | Age: 65
End: 2020-03-06

## 2020-03-06 NOTE — TELEPHONE ENCOUNTER
Please inform pt I would like her to have a recheck of her potassium level in 2 weeks to see if she needs to continue potassium replacement.    Just a reminder she will be having a f/u CXR in 1 month for f/u on pneumonia as well. Order in chart.

## 2020-03-09 NOTE — TELEPHONE ENCOUNTER
Patient informed, she would like to know if she should keep taking Potassium BID or stop until recheck?

## 2020-03-16 ENCOUNTER — RESULTS ENCOUNTER (OUTPATIENT)
Dept: FAMILY MEDICINE CLINIC | Facility: CLINIC | Age: 65
End: 2020-03-16

## 2020-03-16 DIAGNOSIS — I10 ESSENTIAL HYPERTENSION: ICD-10-CM

## 2020-03-16 DIAGNOSIS — E87.6 HYPOKALEMIA: ICD-10-CM

## 2020-03-18 RX ORDER — MESALAMINE 1000 MG/1
SUPPOSITORY RECTAL
Qty: 30 SUPPOSITORY | Refills: 0 | Status: SHIPPED | OUTPATIENT
Start: 2020-03-18 | End: 2020-05-15

## 2020-03-23 RX ORDER — TOPIRAMATE 25 MG/1
TABLET ORAL
Qty: 120 TABLET | Refills: 0 | Status: SHIPPED | OUTPATIENT
Start: 2020-03-23 | End: 2020-04-16 | Stop reason: SDUPTHER

## 2020-03-25 LAB
BUN SERPL-MCNC: 12 MG/DL (ref 8–23)
BUN/CREAT SERPL: 14.3 (ref 7–25)
CALCIUM SERPL-MCNC: 10.3 MG/DL (ref 8.6–10.5)
CHLORIDE SERPL-SCNC: 102 MMOL/L (ref 98–107)
CO2 SERPL-SCNC: 27.7 MMOL/L (ref 22–29)
CREAT SERPL-MCNC: 0.84 MG/DL (ref 0.57–1)
GLUCOSE SERPL-MCNC: 88 MG/DL (ref 65–99)
MAGNESIUM SERPL-MCNC: 2 MG/DL (ref 1.6–2.4)
POTASSIUM SERPL-SCNC: 3.8 MMOL/L (ref 3.5–5.2)
SODIUM SERPL-SCNC: 143 MMOL/L (ref 136–145)

## 2020-04-01 ENCOUNTER — TELEPHONE (OUTPATIENT)
Dept: FAMILY MEDICINE CLINIC | Facility: CLINIC | Age: 65
End: 2020-04-01

## 2020-04-01 NOTE — TELEPHONE ENCOUNTER
Please see if pt plans to have her f/u CXR to confirm complete resolution of her pneumonia. I saw her in passing in the clinic hallway since her last visit and she had mentioned she most likely was not going to have it. Since that time I have gotten messaging requesting confirmation.    She can wait until after COVID restrictions are lifted if she wants.

## 2020-04-02 NOTE — TELEPHONE ENCOUNTER
Patient states that she is going to hold off on the chest xray until the covid restrictions are lifted.

## 2020-04-16 RX ORDER — TOPIRAMATE 25 MG/1
75 TABLET ORAL 2 TIMES DAILY
Qty: 180 TABLET | Refills: 0 | Status: SHIPPED | OUTPATIENT
Start: 2020-04-16 | End: 2020-05-14

## 2020-04-16 NOTE — TELEPHONE ENCOUNTER
PATIENT HAS CALLED TO REQUEST A REFILL OF topiramate (TOPAMAX) 25 MG tablet. SHE STATES THAT HER ORIGINAL PRESCRIPTION WAS TO TAKE 2 TABLETS TWICE A DAY BUT HER DOSAGE WAS INCREASED TO 3 TABLETS TWICE A DAY. SHE WILL BE TAKING HER LAST DOSAGE THIS EVENING.   CONFIRMED PHARMACY AS [facility] IN [place].   PLEASE SEBASTIEN PATIENT FOR ANY QUESTION OR CONCERNS AT   [phone]

## 2020-05-14 RX ORDER — TOPIRAMATE 25 MG/1
TABLET ORAL
Qty: 180 TABLET | Refills: 0 | Status: SHIPPED | OUTPATIENT
Start: 2020-05-14 | End: 2020-06-09

## 2020-05-15 RX ORDER — MESALAMINE 1000 MG/1
SUPPOSITORY RECTAL
Qty: 30 SUPPOSITORY | Refills: 0 | Status: SHIPPED | OUTPATIENT
Start: 2020-05-15 | End: 2020-08-06

## 2020-06-08 ENCOUNTER — TELEPHONE (OUTPATIENT)
Dept: FAMILY MEDICINE CLINIC | Facility: CLINIC | Age: 65
End: 2020-06-08

## 2020-06-08 ENCOUNTER — OFFICE VISIT (OUTPATIENT)
Dept: FAMILY MEDICINE CLINIC | Facility: CLINIC | Age: 65
End: 2020-06-08

## 2020-06-08 DIAGNOSIS — L71.9 ROSACEA: ICD-10-CM

## 2020-06-08 DIAGNOSIS — R73.03 PREDIABETES: ICD-10-CM

## 2020-06-08 DIAGNOSIS — I10 ESSENTIAL HYPERTENSION: Primary | ICD-10-CM

## 2020-06-08 DIAGNOSIS — Z13.220 SCREENING FOR LIPID DISORDERS: ICD-10-CM

## 2020-06-08 PROCEDURE — 99442 PR PHYS/QHP TELEPHONE EVALUATION 11-20 MIN: CPT | Performed by: FAMILY MEDICINE

## 2020-06-08 RX ORDER — DESONIDE 0.5 MG/G
CREAM TOPICAL 2 TIMES DAILY
Qty: 15 G | Refills: 0 | Status: SHIPPED | OUTPATIENT
Start: 2020-06-08

## 2020-06-08 RX ORDER — AZELAIC ACID 0.15 G/G
GEL TOPICAL DAILY
Qty: 50 G | Refills: 5 | Status: SHIPPED | OUTPATIENT
Start: 2020-06-08

## 2020-06-08 NOTE — PROGRESS NOTES
You have chosen to receive care through a telephone visit. Do you consent to use a telephone visit for your medical care today? Yes.

## 2020-06-08 NOTE — TELEPHONE ENCOUNTER
LM for pt to call to schedule follow up for physical w PAP with Dr Powell in late July/early August.

## 2020-06-08 NOTE — PROGRESS NOTES
"TELEPHONE VISIT  06/08/2020      Sylvia Childress  1955    In order to limit face-to-face contact and enact \"social distancing\" in light of the COVID-19 outbreaks and in accordance to the recommendations per the CDC, WHO, and our individual department, she was contacted by telephone.  Telephone visit was used to screen the patient for needs and conduct her regularly scheduled follow-up.     COVID-19 screening: specifically denies fever, body aches, cough, difficulty breathing, sore throat, runny nose, recent travel, or known sick exposures.        History of Present Illness  Mrs. Childress presents today for routine f/u. She has HTN, currently on zestoretic. Taking as rx'd. BP well controlled. No CP, no palpitations, no increased swelling.    Has pre-DM. Intermittent exercise. Not following diabetic diet. Increased right knee pain limiting exercise. Weight down to 201 lbs at home.    She had f/u CXR which showed good resolution of PNE for which she was admitted earlier this year. No cough, mild STALEY when taking stairs.    She requests refills of meds for rosacea. No recent flares but has to careful in sun.    Patient's past medical hx, surgical hx, family hx, social hx reviewed on chart. Medication and allergy lists reviewed on chart. Information updated as indicated.      Review of Systems   Constitutional: Positive for fatigue. Negative for fever and unexpected weight change.   HENT: Negative for congestion, ear pain, mouth sores, postnasal drip, rhinorrhea and sore throat.    Eyes: Negative for visual disturbance.   Respiratory: Negative for cough, shortness of breath and wheezing.    Cardiovascular: Positive for leg swelling. Negative for chest pain and palpitations.   Gastrointestinal: Negative for abdominal pain, diarrhea, nausea and vomiting.   Genitourinary: Negative for dysuria and hematuria.   Musculoskeletal: Positive for arthralgias.   Skin: Positive for rash.   Neurological: Positive for headaches. " Negative for dizziness, tremors, syncope, facial asymmetry, speech difficulty, weakness and numbness.   Hematological: Negative for adenopathy. Does not bruise/bleed easily.   Psychiatric/Behavioral: Negative for confusion.   Pt's previous ROS reviewed and updated as indicated.     Lab Results   Component Value Date    WBC 12.05 (H) 02/28/2020    HGB 11.5 (L) 02/28/2020    HCT 34.1 02/28/2020    MCV 97.2 (H) 02/28/2020     02/28/2020       Lab Results   Component Value Date    GLUCOSE 104 (H) 02/28/2020    BUN 12 03/24/2020    CREATININE 0.84 03/24/2020    EGFRIFNONA 68 03/24/2020    EGFRIFAFRI 83 03/24/2020    BCR 14.3 03/24/2020    K 3.8 03/24/2020    CO2 27.7 03/24/2020    CALCIUM 10.3 03/24/2020    PROTENTOTREF 7.0 05/14/2019    ALBUMIN 4.40 02/27/2020    LABIL2 1.4 05/14/2019    AST 20 02/27/2020    ALT 18 02/27/2020       Lab Results   Component Value Date    CHOL 185 10/20/2016    CHLPL 192 08/24/2018    TRIG 173 (H) 08/24/2018    HDL 48 08/24/2018     (H) 08/24/2018       Lab Results   Component Value Date    HGBA1C 6.10 08/24/2018       Lab Results   Component Value Date    TSH 0.623 02/27/2020           Diagnoses and all orders for this visit:    Essential hypertension  -     CBC (No Diff); Future  -     Comprehensive Metabolic Panel; Future  -     TSH Rfx On Abnormal To Free T4; Future    Prediabetes  -     Comprehensive Metabolic Panel; Future  -     Hemoglobin A1c; Future    Rosacea    Screening for lipid disorders  -     Lipid Panel; Future    Other orders  -     desonide (DESOWEN) 0.05 % cream; Apply  topically to the appropriate area as directed 2 (Two) Times a Day. to affected area  -     azelaic acid (Finacea) 15 % gel; Apply  topically to the appropriate area as directed Daily.      HTN historically controlled. Continue zestoretic. Pt advised to eat a heart healthy diet and get regular aerobic exercise.    Recheck A1c next visit. Diabetes preventive measures reviewed.    Continue  current tx plan for rosacea. Sun protection advised.    Patient will schedule annual physical at her earliest convenience. F/u sooner as needed.  She will have routine labs at that time.  Patient was encouraged to keep me informed of any acute changes, or any new concerning symptoms.  Pt is aware of reasons to seek emergent care.  Patient voiced understanding of all instructions and denied further questions.      This visit has been rescheduled as a phone visit to comply with patient safety concerns in accordance with CDC recommendations. Total time of discussion was 14 minutes.

## 2020-06-09 RX ORDER — TOPIRAMATE 25 MG/1
TABLET ORAL
Qty: 540 TABLET | Refills: 0 | Status: SHIPPED | OUTPATIENT
Start: 2020-06-09 | End: 2020-07-27 | Stop reason: SDUPTHER

## 2020-06-09 RX ORDER — TOPIRAMATE 25 MG/1
TABLET ORAL
Qty: 180 TABLET | Refills: 0 | Status: SHIPPED | OUTPATIENT
Start: 2020-06-09 | End: 2020-06-09

## 2020-06-14 ENCOUNTER — RESULTS ENCOUNTER (OUTPATIENT)
Dept: FAMILY MEDICINE CLINIC | Facility: CLINIC | Age: 65
End: 2020-06-14

## 2020-06-14 DIAGNOSIS — I10 ESSENTIAL HYPERTENSION: ICD-10-CM

## 2020-06-14 DIAGNOSIS — R73.03 PREDIABETES: ICD-10-CM

## 2020-06-14 DIAGNOSIS — Z13.220 SCREENING FOR LIPID DISORDERS: ICD-10-CM

## 2020-07-20 RX ORDER — LISINOPRIL AND HYDROCHLOROTHIAZIDE 12.5; 1 MG/1; MG/1
TABLET ORAL
Qty: 30 TABLET | Refills: 5 | Status: SHIPPED | OUTPATIENT
Start: 2020-07-20

## 2020-07-27 ENCOUNTER — OFFICE VISIT (OUTPATIENT)
Dept: FAMILY MEDICINE CLINIC | Facility: CLINIC | Age: 65
End: 2020-07-27

## 2020-07-27 VITALS
SYSTOLIC BLOOD PRESSURE: 122 MMHG | TEMPERATURE: 98 F | RESPIRATION RATE: 14 BRPM | OXYGEN SATURATION: 96 % | WEIGHT: 203 LBS | BODY MASS INDEX: 31.86 KG/M2 | HEIGHT: 67 IN | HEART RATE: 84 BPM | DIASTOLIC BLOOD PRESSURE: 82 MMHG

## 2020-07-27 DIAGNOSIS — I10 ESSENTIAL HYPERTENSION: ICD-10-CM

## 2020-07-27 DIAGNOSIS — R51.9 CHRONIC INTRACTABLE HEADACHE, UNSPECIFIED HEADACHE TYPE: ICD-10-CM

## 2020-07-27 DIAGNOSIS — Z12.39 ENCOUNTER FOR SCREENING BREAST EXAMINATION: ICD-10-CM

## 2020-07-27 DIAGNOSIS — G89.29 CHRONIC INTRACTABLE HEADACHE, UNSPECIFIED HEADACHE TYPE: ICD-10-CM

## 2020-07-27 DIAGNOSIS — B37.2 INTERTRIGINOUS CANDIDIASIS: ICD-10-CM

## 2020-07-27 DIAGNOSIS — Z01.419 WELL WOMAN EXAM WITH ROUTINE GYNECOLOGICAL EXAM: Primary | ICD-10-CM

## 2020-07-27 DIAGNOSIS — Z12.4 ENCOUNTER FOR PAPANICOLAOU SMEAR FOR CERVICAL CANCER SCREENING: ICD-10-CM

## 2020-07-27 PROCEDURE — 99396 PREV VISIT EST AGE 40-64: CPT | Performed by: FAMILY MEDICINE

## 2020-07-27 RX ORDER — CLOTRIMAZOLE 1 %
CREAM (GRAM) TOPICAL 2 TIMES DAILY
Qty: 60 G | Refills: 2 | Status: SHIPPED | OUTPATIENT
Start: 2020-07-27

## 2020-07-27 RX ORDER — CLOTRIMAZOLE 1 %
CREAM (GRAM) TOPICAL 2 TIMES DAILY
COMMUNITY
End: 2020-07-27 | Stop reason: SDUPTHER

## 2020-07-27 RX ORDER — TOPIRAMATE 25 MG/1
75 TABLET ORAL 2 TIMES DAILY
Qty: 540 TABLET | Refills: 1 | Status: SHIPPED | OUTPATIENT
Start: 2020-07-27

## 2020-07-27 NOTE — PROGRESS NOTES
"Subjective   Sylvia Childress is a 65 y.o. female.     History of Present Illness   Mrs. Childress presents today for well woman check up. She denies acute c/o today. BP has been well controlled. No  or breast complaints. She requests refill of antifungal cream for \"rash under breasts\". Headaches well controlled on topamax. No new focal neuro symptoms.    Recently retired from [employer]. Will be retiring in [place] with her .    Reproductive History  A0  Pregnancy complications (HTN, DM): n/a  Sexual Activity: current, monogamous male   Contraception: n/a  Menses: postmenopausal  STD hx: denies  H/O abnormal pap: no  Cervical procedures: no    Social Hx  Household members: self and   Marital status:   Tobacco use: no  EtOH use: no  Illicit drug use: no    Lifestyle  Diet: varied, generally healthy  Exercise: irregular, generally walking  Using seatbelt: yes  Sunscreen: yes  Mental Health: no concerns  Feels safe in home: yes    Screenings  Last pap: 2016, normal with neg HPV  Last breast exam: unsure  Last mammogram: 2019  Colonoscopy: 2017  DEXA: 2018  FLP: approx 1 year  BG/A1c: approx 1 year  Vitamin D: unsure  Last dental check up: UTD  Last vision exam: UTD  Immunizations UTD: yes      The following portions of the patient's history were reviewed and updated as appropriate: allergies, current medications, past family history, past medical history, past social history, past surgical history and problem list.    Review of Systems   Constitutional: Positive for fatigue. Negative for fever and unexpected weight change.   HENT: Negative for congestion, ear pain, mouth sores, postnasal drip, rhinorrhea and sore throat.    Eyes: Negative for visual disturbance.   Respiratory: Negative for cough, shortness of breath and wheezing.    Cardiovascular: Positive for leg swelling. Negative for chest pain and palpitations.   Gastrointestinal: Negative for abdominal pain, diarrhea, nausea and vomiting. "   Genitourinary: Negative for difficulty urinating, dysuria, genital sores, hematuria, pelvic pain, vaginal bleeding and vaginal discharge.   Musculoskeletal: Positive for arthralgias.   Skin: Positive for rash.   Neurological: Positive for headaches. Negative for dizziness, tremors, syncope, facial asymmetry, speech difficulty, weakness and numbness.   Hematological: Negative for adenopathy. Does not bruise/bleed easily.   Psychiatric/Behavioral: Negative for confusion.   Pt's previous ROS reviewed and updated as indicated.       Objective    Vitals:    07/27/20 1117   BP: 122/82   Pulse: 84   Resp: 14   Temp: 98 °F (36.7 °C)   SpO2: 96%     Body mass index is 31.79 kg/m².      07/27/20  1117   Weight: 92.1 kg (203 lb)       Physical Exam   Constitutional: She is oriented to person, place, and time. She appears well-developed and well-nourished. She is cooperative. She does not appear ill. No distress.   overweight   HENT:   Head: Normocephalic and atraumatic.   Eyes: Conjunctivae and EOM are normal. No scleral icterus.   Neck: Phonation normal. Neck supple. Normal carotid pulses present. No thyroid mass present.   Cardiovascular: Normal rate, regular rhythm, normal heart sounds and intact distal pulses. Exam reveals no gallop.   No murmur heard.  Pulmonary/Chest: Effort normal and breath sounds normal. She exhibits no deformity. Right breast exhibits no inverted nipple, no mass, no nipple discharge, no skin change and no tenderness. Left breast exhibits no inverted nipple, no mass, no nipple discharge, no skin change and no tenderness. Breasts are symmetrical.   Abdominal: Soft. Bowel sounds are normal. She exhibits no distension and no mass. There is no hepatosplenomegaly. There is no tenderness.   Genitourinary: Rectum normal and uterus normal. Rectal exam shows no mass, anal tone normal and guaiac negative stool. Pelvic exam was performed with patient supine. There is no rash, tenderness, lesion or injury on  the right labia. There is no rash, tenderness, lesion or injury on the left labia. Uterus is not enlarged and not tender. Cervix exhibits no motion tenderness, no discharge and no friability. Right adnexum displays no mass and no tenderness. Left adnexum displays no mass and no tenderness. There is tenderness (at introitus) in the vagina. No bleeding in the vagina. No vaginal discharge found.   Genitourinary Comments: Thin prep pap obtained     Vascular Status -  Her right foot exhibits no edema. Her left foot exhibits no edema.  Lymphadenopathy:     She has no cervical adenopathy.     She has no axillary adenopathy.   Neurological: She is alert and oriented to person, place, and time. She has normal strength. She displays no tremor. No cranial nerve deficit or sensory deficit. Gait normal.   Skin: Skin is warm and dry. Lesion (mild vulvar folliculitis) noted. No bruising and no rash noted.   Psychiatric: She has a normal mood and affect. Her behavior is normal. Cognition and memory are normal.   Nursing note and vitals reviewed.  Pt's previous physical exam reviewed and updated as indicated.        Assessment/Plan   Sylvia was seen today for annual exam.    Diagnoses and all orders for this visit:    Well woman exam with routine gynecological exam  -     Liquid-based Pap Smear, Screening; Future    Encounter for screening breast examination    Intertriginous candidiasis  -     clotrimazole (LOTRIMIN) 1 % cream; Apply  topically to the appropriate area as directed 2 (Two) Times a Day.    Chronic intractable headache, unspecified headache type  -     topiramate (TOPAMAX) 25 MG tablet; Take 3 tablets by mouth 2 (Two) Times a Day.    Essential hypertension    Encounter for Papanicolaou smear for cervical cancer screening  -     Liquid-based Pap Smear, Screening; Future      Age appropriate preventive care reviewed including cancer screenings, safety measures, mental health concerns, supplements, prevention of CV disease  and DM, etc. Handout provided.    HTN well controlled. Pt advised to eat a heart healthy diet and get regular aerobic exercise.    Headaches well controlled on topamax.    Labs as previously ordered on chart.    I will contact patient regarding test results and provide instructions regarding any necessary changes in plan of care.    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    Patient was encouraged to keep me informed of any acute changes, or any new concerning symptoms.  Pt is aware of reasons to seek emergent care.  Patient voiced understanding of all instructions and denied further questions.

## 2020-07-28 LAB
ALBUMIN SERPL-MCNC: 4.8 G/DL (ref 3.5–5.2)
ALBUMIN/GLOB SERPL: 2.3 G/DL
ALP SERPL-CCNC: 71 U/L (ref 39–117)
ALT SERPL-CCNC: 18 U/L (ref 1–33)
AST SERPL-CCNC: 15 U/L (ref 1–32)
BILIRUB SERPL-MCNC: 0.2 MG/DL (ref 0–1.2)
BUN SERPL-MCNC: 16 MG/DL (ref 8–23)
BUN/CREAT SERPL: 18.2 (ref 7–25)
CALCIUM SERPL-MCNC: 9.8 MG/DL (ref 8.6–10.5)
CHLORIDE SERPL-SCNC: 104 MMOL/L (ref 98–107)
CHOLEST SERPL-MCNC: 197 MG/DL (ref 0–200)
CO2 SERPL-SCNC: 26.8 MMOL/L (ref 22–29)
CREAT SERPL-MCNC: 0.88 MG/DL (ref 0.57–1)
ERYTHROCYTE [DISTWIDTH] IN BLOOD BY AUTOMATED COUNT: 12.6 % (ref 12.3–15.4)
GLOBULIN SER CALC-MCNC: 2.1 GM/DL
GLUCOSE SERPL-MCNC: 95 MG/DL (ref 65–99)
HBA1C MFR BLD: 5.7 % (ref 4.8–5.6)
HCT VFR BLD AUTO: 40.4 % (ref 34–46.6)
HDLC SERPL-MCNC: 56 MG/DL (ref 40–60)
HGB BLD-MCNC: 13.6 G/DL (ref 12–15.9)
LDLC SERPL CALC-MCNC: 119 MG/DL (ref 0–100)
MCH RBC QN AUTO: 32.5 PG (ref 26.6–33)
MCHC RBC AUTO-ENTMCNC: 33.7 G/DL (ref 31.5–35.7)
MCV RBC AUTO: 96.4 FL (ref 79–97)
PLATELET # BLD AUTO: 203 10*3/MM3 (ref 140–450)
POTASSIUM SERPL-SCNC: 3.7 MMOL/L (ref 3.5–5.2)
PROT SERPL-MCNC: 6.9 G/DL (ref 6–8.5)
RBC # BLD AUTO: 4.19 10*6/MM3 (ref 3.77–5.28)
SODIUM SERPL-SCNC: 141 MMOL/L (ref 136–145)
TRIGL SERPL-MCNC: 110 MG/DL (ref 0–150)
TSH SERPL DL<=0.005 MIU/L-ACNC: 1.21 UIU/ML (ref 0.27–4.2)
VLDLC SERPL CALC-MCNC: 22 MG/DL
WBC # BLD AUTO: 5.3 10*3/MM3 (ref 3.4–10.8)

## 2020-08-06 RX ORDER — MESALAMINE 1000 MG/1
SUPPOSITORY RECTAL
Qty: 30 SUPPOSITORY | Refills: 0 | Status: SHIPPED | OUTPATIENT
Start: 2020-08-06 | End: 2020-09-11

## 2020-09-11 ENCOUNTER — TELEPHONE (OUTPATIENT)
Dept: GASTROENTEROLOGY | Facility: CLINIC | Age: 65
End: 2020-09-11

## 2020-09-11 RX ORDER — MESALAMINE 1000 MG/1
SUPPOSITORY RECTAL
Qty: 30 SUPPOSITORY | Refills: 0 | Status: SHIPPED | OUTPATIENT
Start: 2020-09-11

## 2020-09-11 NOTE — TELEPHONE ENCOUNTER
I called Ms Childress back. Informed her Dr Ordoñez approved one month refill for Canasa Suppositories and he said good luck. Ms Childress wants to thank Dr Ordoñez for everything:he was really good to her. She appreciate the refill.

## 2020-09-21 ENCOUNTER — TELEPHONE (OUTPATIENT)
Dept: FAMILY MEDICINE CLINIC | Facility: CLINIC | Age: 65
End: 2020-09-21

## 2021-06-03 ENCOUNTER — SPOT CHECK NEW (OUTPATIENT)
Dept: URBAN - METROPOLITAN AREA CLINIC 15 | Facility: CLINIC | Age: 66
Setting detail: DERMATOLOGY
End: 2021-06-03

## 2021-06-03 DIAGNOSIS — L21.8 OTHER SEBORRHEIC DERMATITIS: ICD-10-CM

## 2021-06-03 PROCEDURE — 99203 OFFICE O/P NEW LOW 30 MIN: CPT

## 2021-06-03 PROCEDURE — 11102 TANGNTL BX SKIN SINGLE LES: CPT

## 2021-07-14 ENCOUNTER — RX ONLY (RX ONLY)
Age: 66
End: 2021-07-14

## 2021-07-14 ENCOUNTER — MOHS SURGERY-ROUTINE (OUTPATIENT)
Dept: URBAN - METROPOLITAN AREA CLINIC 15 | Facility: CLINIC | Age: 66
Setting detail: DERMATOLOGY
End: 2021-07-14

## 2021-07-14 DIAGNOSIS — Z08 ENCOUNTER FOR FOLLOW-UP EXAMINATION AFTER COMPLETED TREATMENT FOR MALIGNANT NEOPLASM: ICD-10-CM

## 2021-07-14 PROCEDURE — 17311 MOHS 1 STAGE H/N/HF/G: CPT

## 2021-07-14 PROCEDURE — 15260 FTH/GFT FR N/E/E/L 20 SQCM/<: CPT

## 2021-07-14 RX ORDER — DOXYCYCLINE HYCLATE 100 MG/1
1 CAPSULE CAPSULE, GELATIN COATED ORAL TWICE A DAY
Qty: 14 | Refills: 0
Start: 2021-07-14

## 2021-07-21 ENCOUNTER — WOUND CARE (OUTPATIENT)
Dept: URBAN - METROPOLITAN AREA CLINIC 15 | Facility: CLINIC | Age: 66
Setting detail: DERMATOLOGY
End: 2021-07-21

## 2021-07-21 DIAGNOSIS — L57.0 ACTINIC KERATOSIS: ICD-10-CM

## 2021-07-21 PROCEDURE — 99024 POSTOP FOLLOW-UP VISIT: CPT

## 2021-07-28 ENCOUNTER — MOHS SURGERY - FOLLOW UP (OUTPATIENT)
Dept: URBAN - METROPOLITAN AREA CLINIC 15 | Facility: CLINIC | Age: 66
Setting detail: DERMATOLOGY
End: 2021-07-28

## 2021-07-28 DIAGNOSIS — L21.8 OTHER SEBORRHEIC DERMATITIS: ICD-10-CM

## 2021-07-28 PROCEDURE — 99024 POSTOP FOLLOW-UP VISIT: CPT

## 2021-12-09 ENCOUNTER — SKIN CANCER-MELANOMA NEW (OUTPATIENT)
Dept: URBAN - METROPOLITAN AREA CLINIC 15 | Facility: CLINIC | Age: 66
Setting detail: DERMATOLOGY
End: 2021-12-09

## 2021-12-09 DIAGNOSIS — D48.5 NEOPLASM OF UNCERTAIN BEHAVIOR OF SKIN: ICD-10-CM

## 2021-12-09 PROCEDURE — 99213 OFFICE O/P EST LOW 20 MIN: CPT

## 2022-03-26 NOTE — TELEPHONE ENCOUNTER
Dr Ordoñez,  I spoke with Ms Fior. Patient stated she just moved to [place] and she hasn't got established with another GI doctor yet. She just need a month refill. I told her that I have to get the approval from you since you wanted her to schedule a follow up appointment. Patient voiced understanding. Thanks   no

## 2023-01-05 ENCOUNTER — APPOINTMENT (OUTPATIENT)
Dept: URBAN - METROPOLITAN AREA CLINIC 215 | Age: 68
Setting detail: DERMATOLOGY
End: 2023-01-05

## 2023-01-05 DIAGNOSIS — L81.4 OTHER MELANIN HYPERPIGMENTATION: ICD-10-CM

## 2023-01-05 DIAGNOSIS — Z85.828 PERSONAL HISTORY OF OTHER MALIGNANT NEOPLASM OF SKIN: ICD-10-CM

## 2023-01-05 DIAGNOSIS — L82.1 OTHER SEBORRHEIC KERATOSIS: ICD-10-CM

## 2023-01-05 DIAGNOSIS — L73.8 OTHER SPECIFIED FOLLICULAR DISORDERS: ICD-10-CM

## 2023-01-05 DIAGNOSIS — D18.0 HEMANGIOMA: ICD-10-CM

## 2023-01-05 DIAGNOSIS — D22 MELANOCYTIC NEVI: ICD-10-CM

## 2023-01-05 PROBLEM — D18.01 HEMANGIOMA OF SKIN AND SUBCUTANEOUS TISSUE: Status: ACTIVE | Noted: 2023-01-05

## 2023-01-05 PROBLEM — D22.5 MELANOCYTIC NEVI OF TRUNK: Status: ACTIVE | Noted: 2023-01-05

## 2023-01-05 PROCEDURE — OTHER MIPS QUALITY: OTHER

## 2023-01-05 PROCEDURE — OTHER COUNSELING: OTHER

## 2023-01-05 PROCEDURE — 99213 OFFICE O/P EST LOW 20 MIN: CPT

## 2023-01-05 ASSESSMENT — LOCATION SIMPLE DESCRIPTION DERM
LOCATION SIMPLE: RIGHT FOREHEAD
LOCATION SIMPLE: ABDOMEN
LOCATION SIMPLE: CHEST

## 2023-01-05 ASSESSMENT — LOCATION DETAILED DESCRIPTION DERM
LOCATION DETAILED: RIGHT MEDIAL SUPERIOR CHEST
LOCATION DETAILED: RIGHT MEDIAL INFERIOR CHEST
LOCATION DETAILED: LEFT LATERAL ABDOMEN
LOCATION DETAILED: RIGHT MEDIAL FOREHEAD
LOCATION DETAILED: LEFT MEDIAL SUPERIOR CHEST

## 2023-01-05 ASSESSMENT — LOCATION ZONE DERM
LOCATION ZONE: FACE
LOCATION ZONE: TRUNK

## 2023-01-05 NOTE — PROCEDURE: COUNSELING
Nicotinamide Supplementation Recommendations: Take 500 mg of nicotinamide by mouth twice daily.
Sunscreen Recommendations: Regularly apply at least an SPF 30 broad spectrum sunscreen while outdoors during the day.
Detail Level: Detailed
Detail Level: Zone

## 2023-10-11 ENCOUNTER — APPOINTMENT (OUTPATIENT)
Dept: URBAN - METROPOLITAN AREA CLINIC 215 | Age: 68
Setting detail: DERMATOLOGY
End: 2023-10-11

## 2023-10-11 DIAGNOSIS — D18.0 HEMANGIOMA: ICD-10-CM

## 2023-10-11 DIAGNOSIS — Z85.828 PERSONAL HISTORY OF OTHER MALIGNANT NEOPLASM OF SKIN: ICD-10-CM

## 2023-10-11 DIAGNOSIS — D22 MELANOCYTIC NEVI: ICD-10-CM

## 2023-10-11 DIAGNOSIS — L82.1 OTHER SEBORRHEIC KERATOSIS: ICD-10-CM

## 2023-10-11 DIAGNOSIS — L81.4 OTHER MELANIN HYPERPIGMENTATION: ICD-10-CM

## 2023-10-11 PROBLEM — D22.5 MELANOCYTIC NEVI OF TRUNK: Status: ACTIVE | Noted: 2023-10-11

## 2023-10-11 PROBLEM — D18.01 HEMANGIOMA OF SKIN AND SUBCUTANEOUS TISSUE: Status: ACTIVE | Noted: 2023-10-11

## 2023-10-11 PROCEDURE — 99213 OFFICE O/P EST LOW 20 MIN: CPT

## 2023-10-11 PROCEDURE — OTHER COUNSELING: OTHER

## 2023-10-11 ASSESSMENT — LOCATION DETAILED DESCRIPTION DERM
LOCATION DETAILED: RIGHT MEDIAL SUPERIOR CHEST
LOCATION DETAILED: LEFT LATERAL ABDOMEN
LOCATION DETAILED: RIGHT MEDIAL INFERIOR CHEST
LOCATION DETAILED: LEFT MEDIAL SUPERIOR CHEST

## 2023-10-11 ASSESSMENT — LOCATION SIMPLE DESCRIPTION DERM
LOCATION SIMPLE: ABDOMEN
LOCATION SIMPLE: CHEST

## 2023-10-11 ASSESSMENT — LOCATION ZONE DERM: LOCATION ZONE: TRUNK

## 2024-10-16 ENCOUNTER — APPOINTMENT (OUTPATIENT)
Dept: URBAN - METROPOLITAN AREA CLINIC 215 | Age: 69
Setting detail: DERMATOLOGY
End: 2024-10-16

## 2024-10-16 DIAGNOSIS — D18.0 HEMANGIOMA: ICD-10-CM

## 2024-10-16 DIAGNOSIS — L82.1 OTHER SEBORRHEIC KERATOSIS: ICD-10-CM

## 2024-10-16 DIAGNOSIS — Z85.828 PERSONAL HISTORY OF OTHER MALIGNANT NEOPLASM OF SKIN: ICD-10-CM

## 2024-10-16 DIAGNOSIS — L72.0 EPIDERMAL CYST: ICD-10-CM

## 2024-10-16 DIAGNOSIS — L81.4 OTHER MELANIN HYPERPIGMENTATION: ICD-10-CM

## 2024-10-16 DIAGNOSIS — D22 MELANOCYTIC NEVI: ICD-10-CM

## 2024-10-16 DIAGNOSIS — L72.8 OTHER FOLLICULAR CYSTS OF THE SKIN AND SUBCUTANEOUS TISSUE: ICD-10-CM

## 2024-10-16 PROBLEM — D22.5 MELANOCYTIC NEVI OF TRUNK: Status: ACTIVE | Noted: 2024-10-16

## 2024-10-16 PROBLEM — D18.01 HEMANGIOMA OF SKIN AND SUBCUTANEOUS TISSUE: Status: ACTIVE | Noted: 2024-10-16

## 2024-10-16 PROCEDURE — OTHER COUNSELING: OTHER

## 2024-10-16 PROCEDURE — 99213 OFFICE O/P EST LOW 20 MIN: CPT

## 2024-10-16 ASSESSMENT — LOCATION ZONE DERM
LOCATION ZONE: TRUNK
LOCATION ZONE: FACE

## 2024-10-16 ASSESSMENT — LOCATION SIMPLE DESCRIPTION DERM
LOCATION SIMPLE: CHEST
LOCATION SIMPLE: LEFT CHEEK
LOCATION SIMPLE: RIGHT FOREHEAD

## 2024-10-16 ASSESSMENT — LOCATION DETAILED DESCRIPTION DERM
LOCATION DETAILED: RIGHT MEDIAL INFERIOR CHEST
LOCATION DETAILED: LEFT MEDIAL SUPERIOR CHEST
LOCATION DETAILED: RIGHT SUPERIOR FOREHEAD
LOCATION DETAILED: LEFT SUPERIOR CENTRAL MALAR CHEEK
LOCATION DETAILED: RIGHT MEDIAL SUPERIOR CHEST

## 2024-10-16 NOTE — PROCEDURE: COUNSELING
Sunscreen Recommendations: Regularly apply at least an SPF 30 broad spectrum sunscreen while outdoors during the day.
Detail Level: Detailed
Nicotinamide Supplementation Recommendations: Take 500 mg of nicotinamide by mouth twice daily.
Detail Level: Generalized
Detail Level: Simple